# Patient Record
Sex: MALE | Race: WHITE | NOT HISPANIC OR LATINO | Employment: FULL TIME | URBAN - METROPOLITAN AREA
[De-identification: names, ages, dates, MRNs, and addresses within clinical notes are randomized per-mention and may not be internally consistent; named-entity substitution may affect disease eponyms.]

---

## 2021-06-29 NOTE — PROGRESS NOTES
SUBJECTIVE:   CC: Ney Lester is an 61 year old male who presents for preventative health visit.   Patient has been advised of split billing requirements and indicates understanding: Yes  Healthy Habits:     Getting at least 3 servings of Calcium per day:  Yes    Bi-annual eye exam:  Yes    Dental care twice a year:  NO    Sleep apnea or symptoms of sleep apnea:  None    Diet:  Regular (no restrictions)    Frequency of exercise:  2-3 days/week    Duration of exercise:  15-30 minutes    Medication side effects:  None    PHQ-2 Total Score: 2    Additional concerns today:  No  Anxiety on and off for couple years   Was on Celexa in the past stooped it  Unclear reason      Today's PHQ-2 Score:   PHQ-2 ( 1999 Pfizer) 7/2/2021   Q1: Little interest or pleasure in doing things 1   Q2: Feeling down, depressed or hopeless 1   PHQ-2 Score 2   Q1: Little interest or pleasure in doing things Several days   Q2: Feeling down, depressed or hopeless Several days   PHQ-2 Score 2     Preventive -    Immunization History   Administered Date(s) Administered     FLU 6-35 months 10/01/2009     Influenza (IIV3) PF 11/19/2007, 02/12/2009     Td (Adult), Adsorbed 07/26/1999     Tdap (Adacel,Boostrix) 02/12/2009       -STD screen:    Would like to to check for STDs before his current parter he ws with other partners with no protection       - Colon CA screen: Colonoscopy ordered     - Prostate CA screen: Discussed controversy about screening.   No results found for: PSA    - Hep C screen: ordered      -lipids screen: ordered    Diabetes screen: ordered    SH:    Marital status:    Kids: 2  Employment: farmer   Exercise: walking   Tobacco: no   Etoh: not quit   Recreational drugs: no  Caffeine: yes   Sexually active with one partner female using condoms     Abuse: Current or Past(Physical, Sexual or Emotional)- No  Do you feel safe in your environment? Yes    Have you ever done Advance Care Planning? (For example, a Health  Directive, POLST, or a discussion with a medical provider or your loved ones about your wishes): No, advance care planning information given to patient to review.  Patient declined advance care planning discussion at this time.    Social History     Tobacco Use     Smoking status: Never Smoker     Smokeless tobacco: Never Used   Substance Use Topics     Alcohol use: Not Currently         Alcohol Use 7/2/2021   Prescreen: >3 drinks/day or >7 drinks/week? No       Last PSA: No results found for: PSA    Reviewed orders with patient. Reviewed health maintenance and updated orders accordingly - Yes  Lab work is in process  BP Readings from Last 3 Encounters:   07/02/21 137/85    Wt Readings from Last 3 Encounters:   07/02/21 92.5 kg (204 lb)                  Patient Active Problem List   Diagnosis     CARDIOVASCULAR SCREENING; LDL GOAL LESS THAN 160     Past Surgical History:   Procedure Laterality Date     WRIST SURGERY Left        Social History     Tobacco Use     Smoking status: Never Smoker     Smokeless tobacco: Never Used   Substance Use Topics     Alcohol use: Not Currently     History reviewed. No pertinent family history.      No current outpatient medications on file.     No Known Allergies  No lab results found.     Reviewed and updated as needed this visit by clinical staff  Tobacco  Allergies  Meds   Med Hx  Surg Hx  Fam Hx  Soc Hx        Reviewed and updated as needed this visit by Provider                Past Medical History:   Diagnosis Date     Anxiety      Hyperlipidemia       Past Surgical History:   Procedure Laterality Date     WRIST SURGERY Left        Review of Systems   Constitutional: Negative for chills and fever.   HENT: Negative for congestion, ear pain, hearing loss and sore throat.    Eyes: Negative for pain and visual disturbance.   Respiratory: Negative for cough and shortness of breath.    Cardiovascular: Negative for chest pain, palpitations and peripheral edema.  "  Gastrointestinal: Negative for abdominal pain, constipation, diarrhea, heartburn, hematochezia and nausea.   Genitourinary: Positive for frequency and impotence. Negative for discharge, dysuria, genital sores, hematuria and urgency.   Musculoskeletal: Negative for arthralgias, joint swelling and myalgias.   Skin: Negative for rash.   Neurological: Negative for dizziness, weakness, headaches and paresthesias.   Psychiatric/Behavioral: Negative for mood changes. The patient is nervous/anxious.        OBJECTIVE:   /85   Pulse 70   Temp 97.5  F (36.4  C) (Tympanic)   Ht 1.753 m (5' 9\")   Wt 92.5 kg (204 lb)   SpO2 96%   BMI 30.13 kg/m      Physical Exam  GENERAL: healthy, alert and no distress  NECK: no adenopathy, no asymmetry, masses, or scars and thyroid normal to palpation  RESP: lungs clear to auscultation - no rales, rhonchi or wheezes  CV: regular rate and rhythm, normal S1 S2, no S3 or S4, no murmur, click or rub, no peripheral edema and peripheral pulses strong  ABDOMEN: soft, nontender, no hepatosplenomegaly, no masses and bowel sounds normal  MS: no gross musculoskeletal defects noted, no edema        ASSESSMENT/PLAN:   1. Routine general medical examination at a health care facility  Preventive care reviewed and updated     - Lipid panel reflex to direct LDL Fasting  - GLUCOSE    2. Erectile dysfunction, unspecified erectile dysfunction type    - sildenafil (REVATIO) 20 MG tablet; Take 1-5 tabs prior to intercourse on empty stomach as needed  Dispense: 30 tablet; Refill: 1    3. Anxiety and depression    - citalopram (CELEXA) 20 MG tablet; Take 1 tablet (20 mg) by mouth daily  Dispense: 90 tablet; Refill: 1    4. Screen for STD (sexually transmitted disease)    - HIV Antigen Antibody Combo  - Hepatitis C Screen Reflex to HCV RNA Quant and Genotype  - Hepatitis B core antibody [MEO8857]  - Hepatitis B Surface Antibody [[id number]]  - Hepatitis B surface antigen [[id number]]  - Treponema Abs w Reflex to " "RPR and Titer [[id number]]  - Chlamydia trachomatis PCR [[id number]]  - Neisseria gonorrhoeae PCR [[id number]]    5. Colon cancer screening  - GASTROENTEROLOGY ADULT REF PROCEDURE ONLY    6. Screening for prostate cancer    - PSA, screen    Patient has been advised of split billing requirements and indicates understanding: Yes  COUNSELING:   Reviewed preventive health counseling, as reflected in patient instructions       Regular exercise       Healthy diet/nutrition       Alcohol Use        Consider Hep C screening for all patients one time for ages 18-79 years       Syphilis screening for high risk patients        HIV screeninx in teen years, 1x in adult years, and at intervals if high risk       Colon cancer screening       Prostate cancer screening    Estimated body mass index is 30.13 kg/m  as calculated from the following:    Height as of this encounter: 1.753 m (5' 9\").    Weight as of this encounter: 92.5 kg (204 lb).     Weight management plan: Discussed healthy diet and exercise guidelines    He reports that he has never smoked. He has never used smokeless tobacco.      Counseling Resources:  ATP IV Guidelines  Pooled Cohorts Equation Calculator  FRAX Risk Assessment  ICSI Preventive Guidelines  Dietary Guidelines for Americans,   USDA's MyPlate  ASA Prophylaxis  Lung CA Screening    Ousmane Wade MD  [facility]  "

## 2021-07-02 ENCOUNTER — OFFICE VISIT (OUTPATIENT)
Dept: FAMILY MEDICINE | Facility: CLINIC | Age: 61
End: 2021-07-02
Payer: COMMERCIAL

## 2021-07-02 VITALS
BODY MASS INDEX: 30.21 KG/M2 | WEIGHT: 204 LBS | HEART RATE: 70 BPM | DIASTOLIC BLOOD PRESSURE: 85 MMHG | HEIGHT: 69 IN | TEMPERATURE: 97.5 F | SYSTOLIC BLOOD PRESSURE: 137 MMHG | OXYGEN SATURATION: 96 %

## 2021-07-02 DIAGNOSIS — F41.9 ANXIETY AND DEPRESSION: ICD-10-CM

## 2021-07-02 DIAGNOSIS — N52.9 ERECTILE DYSFUNCTION, UNSPECIFIED ERECTILE DYSFUNCTION TYPE: ICD-10-CM

## 2021-07-02 DIAGNOSIS — Z12.5 SCREENING FOR PROSTATE CANCER: ICD-10-CM

## 2021-07-02 DIAGNOSIS — F32.A ANXIETY AND DEPRESSION: ICD-10-CM

## 2021-07-02 DIAGNOSIS — Z00.00 ROUTINE GENERAL MEDICAL EXAMINATION AT A HEALTH CARE FACILITY: Primary | ICD-10-CM

## 2021-07-02 DIAGNOSIS — Z12.11 COLON CANCER SCREENING: ICD-10-CM

## 2021-07-02 DIAGNOSIS — Z11.3 SCREEN FOR STD (SEXUALLY TRANSMITTED DISEASE): ICD-10-CM

## 2021-07-02 LAB
CHOLEST SERPL-MCNC: 224 MG/DL
GLUCOSE SERPL-MCNC: 91 MG/DL (ref 70–99)
HBV CORE AB SERPL QL IA: NONREACTIVE
HBV SURFACE AB SERPL IA-ACNC: 0.57 M[IU]/ML
HBV SURFACE AG SERPL QL IA: NONREACTIVE
HCV AB SERPL QL IA: NONREACTIVE
HDLC SERPL-MCNC: 60 MG/DL
HIV 1+2 AB+HIV1 P24 AG SERPL QL IA: NONREACTIVE
LDLC SERPL CALC-MCNC: 142 MG/DL
NONHDLC SERPL-MCNC: 164 MG/DL
PSA SERPL-ACNC: 4.05 UG/L (ref 0–4)
T PALLIDUM AB SER QL: NONREACTIVE
TRIGL SERPL-MCNC: 109 MG/DL

## 2021-07-02 PROCEDURE — 87389 HIV-1 AG W/HIV-1&-2 AB AG IA: CPT | Performed by: FAMILY MEDICINE

## 2021-07-02 PROCEDURE — 86803 HEPATITIS C AB TEST: CPT | Performed by: FAMILY MEDICINE

## 2021-07-02 PROCEDURE — 86704 HEP B CORE ANTIBODY TOTAL: CPT | Performed by: FAMILY MEDICINE

## 2021-07-02 PROCEDURE — 86706 HEP B SURFACE ANTIBODY: CPT | Performed by: FAMILY MEDICINE

## 2021-07-02 PROCEDURE — 99386 PREV VISIT NEW AGE 40-64: CPT | Performed by: FAMILY MEDICINE

## 2021-07-02 PROCEDURE — 87491 CHLMYD TRACH DNA AMP PROBE: CPT | Performed by: FAMILY MEDICINE

## 2021-07-02 PROCEDURE — 87591 N.GONORRHOEAE DNA AMP PROB: CPT | Performed by: FAMILY MEDICINE

## 2021-07-02 PROCEDURE — 99213 OFFICE O/P EST LOW 20 MIN: CPT | Mod: 25 | Performed by: FAMILY MEDICINE

## 2021-07-02 PROCEDURE — 99000 SPECIMEN HANDLING OFFICE-LAB: CPT | Performed by: FAMILY MEDICINE

## 2021-07-02 PROCEDURE — 80061 LIPID PANEL: CPT | Performed by: FAMILY MEDICINE

## 2021-07-02 PROCEDURE — 36415 COLL VENOUS BLD VENIPUNCTURE: CPT | Performed by: FAMILY MEDICINE

## 2021-07-02 PROCEDURE — G0103 PSA SCREENING: HCPCS | Performed by: FAMILY MEDICINE

## 2021-07-02 PROCEDURE — 82947 ASSAY GLUCOSE BLOOD QUANT: CPT | Performed by: FAMILY MEDICINE

## 2021-07-02 PROCEDURE — 87340 HEPATITIS B SURFACE AG IA: CPT | Performed by: FAMILY MEDICINE

## 2021-07-02 PROCEDURE — 86780 TREPONEMA PALLIDUM: CPT | Mod: 90 | Performed by: FAMILY MEDICINE

## 2021-07-02 RX ORDER — SILDENAFIL CITRATE 20 MG/1
TABLET ORAL
Qty: 30 TABLET | Refills: 1 | Status: SHIPPED | OUTPATIENT
Start: 2021-07-02 | End: 2021-10-19

## 2021-07-02 RX ORDER — CITALOPRAM HYDROBROMIDE 20 MG/1
20 TABLET ORAL DAILY
Qty: 90 TABLET | Refills: 1 | Status: SHIPPED | OUTPATIENT
Start: 2021-07-02 | End: 2022-06-29

## 2021-07-02 ASSESSMENT — ENCOUNTER SYMPTOMS
NAUSEA: 0
COUGH: 0
CONSTIPATION: 0
FEVER: 0
WEAKNESS: 0
DIARRHEA: 0
ABDOMINAL PAIN: 0
HEADACHES: 0
PARESTHESIAS: 0
MYALGIAS: 0
CHILLS: 0
DYSURIA: 0
SORE THROAT: 0
NERVOUS/ANXIOUS: 1
HEARTBURN: 0
EYE PAIN: 0
SHORTNESS OF BREATH: 0
FREQUENCY: 1
HEMATOCHEZIA: 0
PALPITATIONS: 0
DIZZINESS: 0
JOINT SWELLING: 0
HEMATURIA: 0
ARTHRALGIAS: 0

## 2021-07-02 ASSESSMENT — MIFFLIN-ST. JEOR: SCORE: 1720.72

## 2021-07-03 LAB
C TRACH DNA SPEC QL NAA+PROBE: NEGATIVE
N GONORRHOEA DNA SPEC QL NAA+PROBE: NEGATIVE
SPECIMEN SOURCE: NORMAL
SPECIMEN SOURCE: NORMAL

## 2021-07-07 ENCOUNTER — TELEPHONE (OUTPATIENT)
Dept: FAMILY MEDICINE | Facility: CLINIC | Age: 61
End: 2021-07-07

## 2022-01-15 ENCOUNTER — HEALTH MAINTENANCE LETTER (OUTPATIENT)
Age: 62
End: 2022-01-15

## 2022-02-01 ENCOUNTER — OFFICE VISIT (OUTPATIENT)
Dept: FAMILY MEDICINE | Facility: CLINIC | Age: 62
End: 2022-02-01
Payer: COMMERCIAL

## 2022-02-01 VITALS
DIASTOLIC BLOOD PRESSURE: 70 MMHG | OXYGEN SATURATION: 99 % | HEART RATE: 52 BPM | SYSTOLIC BLOOD PRESSURE: 110 MMHG | TEMPERATURE: 96.9 F | WEIGHT: 216 LBS | BODY MASS INDEX: 31.99 KG/M2 | HEIGHT: 69 IN

## 2022-02-01 DIAGNOSIS — E78.5 HYPERLIPIDEMIA LDL GOAL <100: ICD-10-CM

## 2022-02-01 DIAGNOSIS — R59.0 SUPRACLAVICULAR LYMPHADENOPATHY: Primary | ICD-10-CM

## 2022-02-01 DIAGNOSIS — R97.20 ELEVATED PROSTATE SPECIFIC ANTIGEN (PSA): ICD-10-CM

## 2022-02-01 DIAGNOSIS — Z12.11 SCREEN FOR COLON CANCER: ICD-10-CM

## 2022-02-01 DIAGNOSIS — F10.20 ALCOHOLISM (H): ICD-10-CM

## 2022-02-01 LAB
ALBUMIN SERPL-MCNC: 3.8 G/DL (ref 3.4–5)
ALP SERPL-CCNC: 71 U/L (ref 40–150)
ALT SERPL W P-5'-P-CCNC: 28 U/L (ref 0–70)
ANION GAP SERPL CALCULATED.3IONS-SCNC: 6 MMOL/L (ref 3–14)
AST SERPL W P-5'-P-CCNC: 5 U/L (ref 0–45)
BASOPHILS # BLD AUTO: 0.1 10E3/UL (ref 0–0.2)
BASOPHILS NFR BLD AUTO: 2 %
BILIRUB SERPL-MCNC: 0.8 MG/DL (ref 0.2–1.3)
BUN SERPL-MCNC: 18 MG/DL (ref 7–30)
CALCIUM SERPL-MCNC: 9.3 MG/DL (ref 8.5–10.1)
CHLORIDE BLD-SCNC: 106 MMOL/L (ref 94–109)
CHOLEST SERPL-MCNC: 224 MG/DL
CO2 SERPL-SCNC: 29 MMOL/L (ref 20–32)
CREAT SERPL-MCNC: 0.99 MG/DL (ref 0.66–1.25)
EOSINOPHIL # BLD AUTO: 0.2 10E3/UL (ref 0–0.7)
EOSINOPHIL NFR BLD AUTO: 3 %
ERYTHROCYTE [DISTWIDTH] IN BLOOD BY AUTOMATED COUNT: 13.4 % (ref 10–15)
FASTING STATUS PATIENT QL REPORTED: YES
FERRITIN SERPL-MCNC: 162 NG/ML (ref 26–388)
GFR SERPL CREATININE-BSD FRML MDRD: 86 ML/MIN/1.73M2
GLUCOSE BLD-MCNC: 86 MG/DL (ref 70–99)
HCT VFR BLD AUTO: 42.7 % (ref 40–53)
HDLC SERPL-MCNC: 49 MG/DL
HGB BLD-MCNC: 14.3 G/DL (ref 13.3–17.7)
LDLC SERPL CALC-MCNC: 148 MG/DL
LYMPHOCYTES # BLD AUTO: 2.2 10E3/UL (ref 0.8–5.3)
LYMPHOCYTES NFR BLD AUTO: 39 %
MCH RBC QN AUTO: 28.1 PG (ref 26.5–33)
MCHC RBC AUTO-ENTMCNC: 33.5 G/DL (ref 31.5–36.5)
MCV RBC AUTO: 84 FL (ref 78–100)
MONOCYTES # BLD AUTO: 0.5 10E3/UL (ref 0–1.3)
MONOCYTES NFR BLD AUTO: 9 %
NEUTROPHILS # BLD AUTO: 2.6 10E3/UL (ref 1.6–8.3)
NEUTROPHILS NFR BLD AUTO: 47 %
NONHDLC SERPL-MCNC: 175 MG/DL
PLATELET # BLD AUTO: 212 10E3/UL (ref 150–450)
POTASSIUM BLD-SCNC: 4.1 MMOL/L (ref 3.4–5.3)
PROT SERPL-MCNC: 7.6 G/DL (ref 6.8–8.8)
PSA SERPL-MCNC: 4.6 UG/L (ref 0–4)
RBC # BLD AUTO: 5.09 10E6/UL (ref 4.4–5.9)
SODIUM SERPL-SCNC: 141 MMOL/L (ref 133–144)
TRIGL SERPL-MCNC: 135 MG/DL
WBC # BLD AUTO: 5.6 10E3/UL (ref 4–11)

## 2022-02-01 PROCEDURE — 36415 COLL VENOUS BLD VENIPUNCTURE: CPT | Performed by: FAMILY MEDICINE

## 2022-02-01 PROCEDURE — 80061 LIPID PANEL: CPT | Performed by: FAMILY MEDICINE

## 2022-02-01 PROCEDURE — 80053 COMPREHEN METABOLIC PANEL: CPT | Performed by: FAMILY MEDICINE

## 2022-02-01 PROCEDURE — 99214 OFFICE O/P EST MOD 30 MIN: CPT | Mod: 25 | Performed by: FAMILY MEDICINE

## 2022-02-01 PROCEDURE — G0103 PSA SCREENING: HCPCS | Performed by: FAMILY MEDICINE

## 2022-02-01 PROCEDURE — 90472 IMMUNIZATION ADMIN EACH ADD: CPT | Performed by: FAMILY MEDICINE

## 2022-02-01 PROCEDURE — 82728 ASSAY OF FERRITIN: CPT | Performed by: FAMILY MEDICINE

## 2022-02-01 PROCEDURE — 90471 IMMUNIZATION ADMIN: CPT | Performed by: FAMILY MEDICINE

## 2022-02-01 PROCEDURE — 85025 COMPLETE CBC W/AUTO DIFF WBC: CPT | Performed by: FAMILY MEDICINE

## 2022-02-01 PROCEDURE — 90682 RIV4 VACC RECOMBINANT DNA IM: CPT | Performed by: FAMILY MEDICINE

## 2022-02-01 PROCEDURE — 90715 TDAP VACCINE 7 YRS/> IM: CPT | Performed by: FAMILY MEDICINE

## 2022-02-01 ASSESSMENT — PATIENT HEALTH QUESTIONNAIRE - PHQ9
SUM OF ALL RESPONSES TO PHQ QUESTIONS 1-9: 7
SUM OF ALL RESPONSES TO PHQ QUESTIONS 1-9: 7
10. IF YOU CHECKED OFF ANY PROBLEMS, HOW DIFFICULT HAVE THESE PROBLEMS MADE IT FOR YOU TO DO YOUR WORK, TAKE CARE OF THINGS AT HOME, OR GET ALONG WITH OTHER PEOPLE: NOT DIFFICULT AT ALL

## 2022-02-01 ASSESSMENT — PAIN SCALES - GENERAL: PAINLEVEL: NO PAIN (0)

## 2022-02-01 ASSESSMENT — MIFFLIN-ST. JEOR: SCORE: 1770.15

## 2022-02-01 NOTE — NURSING NOTE
Prior to immunization administration, verified patients identity using patient s name and date of birth. Please see Immunization Activity for additional information.     Screening Questionnaire for Adult Immunization    Are you sick today?   No   Do you have allergies to medications, food, a vaccine component or latex?   No   Have you ever had a serious reaction after receiving a vaccination?   No   Do you have a long-term health problem with heart, lung, kidney, or metabolic disease (e.g., diabetes), asthma, a blood disorder, no spleen, complement component deficiency, a cochlear implant, or a spinal fluid leak?  Are you on long-term aspirin therapy?   No   Do you have cancer, leukemia, HIV/AIDS, or any other immune system problem?   No   Do you have a parent, brother, or sister with an immune system problem?   No   In the past 3 months, have you taken medications that affect  your immune system, such as prednisone, other steroids, or anticancer drugs; drugs for the treatment of rheumatoid arthritis, Crohn s disease, or psoriasis; or have you had radiation treatments?   No   Have you had a seizure, or a brain or other nervous system problem?   No   During the past year, have you received a transfusion of blood or blood    products, or been given immune (gamma) globulin or antiviral drug?   No   For women: Are you pregnant or is there a chance you could become       pregnant during the next month?   No   Have you received any vaccinations in the past 4 weeks?   No     Immunization questionnaire answers were all negative.        Per orders of Dr. Wade, injection of FLU & Tdap given by Elizabeth Roca CMA. Patient instructed to remain in clinic for 15 minutes afterwards, and to report any adverse reaction to me immediately.       Screening performed by Elizabeth Roca CMA on 2/1/2022 at 10:41 AM.

## 2022-02-01 NOTE — PATIENT INSTRUCTIONS
1. Recheck PSA today if elevated will send you to Urology     2. Check lipid profile today , consider starting statin therapy     3. Obtain CT neck for concern of enlarged lymph node above the left clavicle     4. Great work stopping alcohol , will check liver profile     5.scheule colonoscopy for colon cancer screen     6. Vaccine today TDAP and Flu

## 2022-02-01 NOTE — PROGRESS NOTES
"  Assessment & Plan     Supraclavicular lymphadenopathy  Noticed on exam today . Painless enlarged left supraclavicular lymphadenopathy concerning for cancer   Patient is asymptomatic   - CT Soft Tissue Neck w/o & w Contrast; Future  - CBC with platelets and differential; Future  - Ferritin; Future    Hyperlipidemia LDL goal <100  Chronic stable problem   cosider starting statin therapy   The 10-year ASCVD risk score (Sean LUND Jr., et al., 2013) is: 7.6%    Values used to calculate the score:      Age: 62 years      Sex: Male      Is Non- : No      Diabetic: No      Tobacco smoker: No      Systolic Blood Pressure: 110 mmHg      Is BP treated: No      HDL Cholesterol: 60 mg/dL      Total Cholesterol: 224 mg/dL    - Lipid panel reflex to direct LDL Fasting; Future    Elevated prostate specific antigen (PSA)  PSA   Date Value Ref Range Status   07/02/2021 4.05 (H) 0 - 4 ug/L Final     Comment:     Assay Method:  Chemiluminescence using Siemens Vista analyzer     If elevated will refer to Urology   - PSA, screen; Future    Alcoholism (H)  He stopped drinking in 07/2021  Continue to monitor   Check liver panel   - Comprehensive metabolic panel (BMP + Alb, Alk Phos, ALT, AST, Total. Bili, TP); Future    Screen for colon cancer    - Adult Gastro Ref - Procedure Only; Future    Patient Instructions   1. Recheck PSA today if elevated will send you to Urology     2. Check lipid profile today , consider starting statin therapy     3. Obtain CT neck for concern of enlarged lymph node above the left clavicle     4. Great work stopping alcohol , will check liver profile     5.scheule colonoscopy for colon cancer screen     6. Vaccine today TDAP and Flu                  BMI:   Estimated body mass index is 31.9 kg/m  as calculated from the following:    Height as of this encounter: 1.753 m (5' 9\").    Weight as of this encounter: 98 kg (216 lb).           Return in about 3 months (around 5/1/2022) for Follow " up.    Ousmane Wade MD  [facility] CHENCHO Brewster is a 62 year old who presents for the following health issues     History of Present Illness       Hyperlipidemia:  He presents for follow up of hyperlipidemia.  He is not taking medication to lower cholesterol. He is not having myalgia or other side effects to statin medications.    He eats 0-1 servings of fruits and vegetables daily.He consumes 0 sweetened beverage(s) daily.He exercises with enough effort to increase his heart rate 30 to 60 minutes per day.  He exercises with enough effort to increase his heart rate 4 days per week. He is missing 2 dose(s) of medications per week.     Answers for HPI/ROS submitted by the patient on 2/1/2022  If you checked off any problems, how difficult have these problems made it for you to do your work, take care of things at home, or get along with other people?: Not difficult at all  PHQ9 TOTAL SCORE: 7          Review of Systems   Constitutional, HEENT, cardiovascular, pulmonary, gi and gu systems are negative, except as otherwise noted.      Objective    There were no vitals taken for this visit.  There is no height or weight on file to calculate BMI.  Physical Exam  Vitals and nursing note reviewed.   Constitutional:       General: He is not in acute distress.     Appearance: Normal appearance. He is not ill-appearing, toxic-appearing or diaphoretic.   HENT:      Head: Atraumatic.   Neck:     Chest:   Breasts:      Right: No supraclavicular adenopathy.      Left: Supraclavicular adenopathy present.       Lymphadenopathy:      Upper Body:      Right upper body: No supraclavicular adenopathy.      Left upper body: Supraclavicular adenopathy present.   Neurological:      Mental Status: He is alert.

## 2022-02-02 ENCOUNTER — TELEPHONE (OUTPATIENT)
Dept: FAMILY MEDICINE | Facility: CLINIC | Age: 62
End: 2022-02-02

## 2022-02-02 ENCOUNTER — ANCILLARY PROCEDURE (OUTPATIENT)
Dept: CT IMAGING | Facility: CLINIC | Age: 62
End: 2022-02-02
Attending: FAMILY MEDICINE
Payer: COMMERCIAL

## 2022-02-02 DIAGNOSIS — R59.0 SUPRACLAVICULAR LYMPHADENOPATHY: ICD-10-CM

## 2022-02-02 PROCEDURE — 70491 CT SOFT TISSUE NECK W/DYE: CPT | Mod: TC | Performed by: RADIOLOGY

## 2022-02-02 RX ORDER — IOPAMIDOL 755 MG/ML
80 INJECTION, SOLUTION INTRAVASCULAR ONCE
Status: COMPLETED | OUTPATIENT
Start: 2022-02-02 | End: 2022-02-02

## 2022-02-02 RX ADMIN — IOPAMIDOL 80 ML: 755 INJECTION, SOLUTION INTRAVASCULAR at 13:23

## 2022-02-02 ASSESSMENT — PATIENT HEALTH QUESTIONNAIRE - PHQ9: SUM OF ALL RESPONSES TO PHQ QUESTIONS 1-9: 7

## 2022-02-02 NOTE — TELEPHONE ENCOUNTER
Left a message to return a call to [phone].  Nenita Flood R.N.     RN:  Whomever speaks with Ney please relay the message from Dr. Wade.  If further support is needed please take down what is needed and route the message back to the provider  Thank you. Nenita Flood R.N.

## 2022-02-02 NOTE — TELEPHONE ENCOUNTER
----- Message from Ousmane Wade MD sent at 2/2/2022  9:15 AM CST -----  Please call the patient     Tiffanie Brewster  PSA still elevated . Next step is to a urologist for further evaluation and management with possible biopsy.    Referral placed    Urology  [address] [place] [address]  [place] [address]  Phone: [phone]    Comment: Please be aware that coverage of these services is subject to the terms and limitations of your health insurance plan.  Call member services at your health plan with any benefit or coverage questions.  [employer] will call you to coordinate your care as prescribed by the provider.  If you don't hear from a representative within 2 business days, please call the number listed above.      Please continue with the plan as we discussed in the clinic.  Feel free to contact the clinic with any questions or concerns. Thank you for allowing us to participate in your care.    Ousmane Wade MD.

## 2022-02-02 NOTE — TELEPHONE ENCOUNTER
Patient informed of provider note as below. Patient verbalized understanding    Bridget Small BSN, RN

## 2022-02-18 ENCOUNTER — TELEPHONE (OUTPATIENT)
Dept: FAMILY MEDICINE | Facility: CLINIC | Age: 62
End: 2022-02-18
Payer: COMMERCIAL

## 2022-02-18 NOTE — TELEPHONE ENCOUNTER
Left a message to return a call to [phone].  Nenita Flood R.N.      RN:  Whomever speaks with Ney please follow up and see if he received the message from Dr. Wade below. In looking at the lab result note, I do see that it has been seen by the patient.  Thank you. Neinta Flood R.N.    Tiffanie Brewster,  CT neck showed enlarged lymph node above the clavicle.  Per radiologist interpretation this is an indeterminate at this time.  This lymph node could be reactive due to inflammation or infection or it could also be due to cancer.  The next step is to see ENT to discuss biopsy from the lymph node to rule out malignancy.  Referral placed      Ent  [id number] [address] [place] [id number]  Phone: [phone]    Comment: Please be aware that coverage of these services is subject to the terms and limitations of your health insurance plan.  Call member services at your health plan with any benefit or coverage questions.  Please call to schedule your appointment           Please continue with the plan as we discussed in the clinic.  Feel free to contact the clinic with any questions or concerns. Thank you for allowing us to participate in your care.     Ousmane Wade MD.

## 2022-02-18 NOTE — TELEPHONE ENCOUNTER
Patient informed of provider note as below. Patient verbalized understanding  Patient requested referral information to be sent via [facility]  Bridget GARRETTN, RN

## 2022-02-18 NOTE — TELEPHONE ENCOUNTER
----- Message from Ousmane Wade MD sent at 2/17/2022  8:07 PM CST -----  Please call the patient to make sure he is aware that he need to follow up with ENT for lymph node biopsy  he may need assistance to schedule .  Referral was placed   Ousmane Wade MD.

## 2022-03-31 ENCOUNTER — OFFICE VISIT (OUTPATIENT)
Dept: UROLOGY | Facility: CLINIC | Age: 62
End: 2022-03-31
Payer: COMMERCIAL

## 2022-03-31 VITALS — DIASTOLIC BLOOD PRESSURE: 78 MMHG | RESPIRATION RATE: 16 BRPM | HEART RATE: 64 BPM | SYSTOLIC BLOOD PRESSURE: 137 MMHG

## 2022-03-31 DIAGNOSIS — R97.20 ELEVATED PROSTATE SPECIFIC ANTIGEN (PSA): ICD-10-CM

## 2022-03-31 PROCEDURE — 51798 US URINE CAPACITY MEASURE: CPT | Performed by: UROLOGY

## 2022-03-31 PROCEDURE — 99204 OFFICE O/P NEW MOD 45 MIN: CPT | Mod: 25 | Performed by: UROLOGY

## 2022-03-31 NOTE — NURSING NOTE
"Initial /78 (BP Location: Left arm, Patient Position: Chair, Cuff Size: Adult Regular)   Pulse 64   Resp 16  Estimated body mass index is 31.9 kg/m  as calculated from the following:    Height as of 2/1/22: 1.753 m (5' 9\").    Weight as of 2/1/22: 98 kg (216 lb). .    Patient is here for elevated psa.  peter glez LPN    "

## 2022-03-31 NOTE — PROGRESS NOTES
CC: elevated PSA    HPI: 61 yo male with elevated PSA with history of frequent voiding since 20's.  No change in his symptoms recently.    PMHx: Hx ETOH abuse, ED  PSHx: none  MEDS: citalopram, sildenafil  NKDA  FHx: father with CaP, brother with BPH  SocHx: neg tobac, ETOH neg for a month  ROS: neg for f/c/s, n/v, wt changes    OBJECTIVE:    abd soft, NT  circ phallus, NL meatus  Left Testis atrophic, no masses  Right testis no masses  WILLIAM: limited to apex, no masses    PSA 7/2/21: 4.05 ng/mL  PSA 2/1/22 4.6 ng/mL    VOID/PVR: 150/0 mL    ASSESSMENT AND PLAN:   Over half of today's 47-minute visit was spent reviewing the chart, results and counseling the patient regarding his elevated PSA.  Given the persistently elevated PSA on 2 recent measures, we will move forward with a prostate MRI.  The patient will call for results.  We will make further decisions on the need  For biopsy based upon those results. The patient is in agreement with the plan.

## 2022-04-01 ENCOUNTER — OFFICE VISIT (OUTPATIENT)
Dept: OTOLARYNGOLOGY | Facility: CLINIC | Age: 62
End: 2022-04-01
Attending: FAMILY MEDICINE
Payer: COMMERCIAL

## 2022-04-01 VITALS — SYSTOLIC BLOOD PRESSURE: 138 MMHG | HEART RATE: 89 BPM | DIASTOLIC BLOOD PRESSURE: 79 MMHG

## 2022-04-01 DIAGNOSIS — R59.0 SUPRACLAVICULAR LYMPHADENOPATHY: ICD-10-CM

## 2022-04-01 PROCEDURE — 99203 OFFICE O/P NEW LOW 30 MIN: CPT | Mod: 25 | Performed by: OTOLARYNGOLOGY

## 2022-04-01 PROCEDURE — 31575 DIAGNOSTIC LARYNGOSCOPY: CPT | Performed by: OTOLARYNGOLOGY

## 2022-04-01 ASSESSMENT — ENCOUNTER SYMPTOMS
CONSTITUTIONAL NEGATIVE: 1
BLURRED VISION: 0
HEADACHES: 0
BRUISES/BLEEDS EASILY: 0
NAUSEA: 0
TREMORS: 0
TINGLING: 0
PHOTOPHOBIA: 0
SPUTUM PRODUCTION: 0
COUGH: 0
HEMOPTYSIS: 0
DOUBLE VISION: 0
DIZZINESS: 0
VOMITING: 0
HEARTBURN: 0

## 2022-04-01 NOTE — LETTER
4/1/2022         RE: Ney Lester  [address] [facility] [address]        Dear Colleague,    Thank you for referring your patient, Ney Lester, to the [facility]. Please see a copy of my visit note below.    Chief Complaint   Patient presents with     Consult     supraclavicular Lymphadenopathy         HPI    This pleasant patient is referred to us for enlarged lymph nodes including left supraclavicular LAP. His primary physician noticed that a couple of months ago. Denies any weight changes, fever, night sweat, difficulty swallowing, voice changes, allergies, post nasal drip, coughing or reflux. He is otherwise healthy. He is not a smoker.    PSA is elevated,  CBC Is WNLs.    CT SCAN OF THE NECK WITH CONTRAST  2/2/2022 1:33 PM      HISTORY: Supraclavicular lymphadenopathy.     TECHNIQUE: Axial CT images of the neck following administration of  intravenous contrast with reformations. Radiation dose for this scan  was reduced using automated exposure control, adjustment of the mA  and/or kV according to patient size, or iterative reconstruction  technique. 80 mL Isovue-370 IV.      COMPARISON: None.      FINDINGS:   Visualized paranasal sinuses, skull base and orbits: Minimal mucosal  thickening noted in the right maxillary sinus. Otherwise the  visualized paranasal sinuses are clear. Visualized aspects of the  mastoid and middle ear cavities are clear.      Oral cavity, pharynx, larynx and visualized trachea: Calcified  bilateral palatine tonsilloliths are present. There also may be a  small calcified tonsillolith in the left fossa of Rosenmuller  associated with the left pharyngeal tonsil. Evaluation of the oral  cavity and pharynx is somewhat limited due to streak artifact from the  patient's dental amalgam. Within that limitation, the oral  cavity/floor of mouth structures appear normal. There is mild  symmetric fullness of the palatine tonsils, as well as mild  prominence  of the soft tissue of the base of tongue, likely prominence of the  lingual tonsil tissue. This is probably physiologic/reactive. No  discrete focal enhancing mass is identified along the mucosal spaces  of the upper aerodigestive tract on CT. The true vocal folds and  subglottic larynx/upper trachea appear normal.      and parapharyngeal spaces: Unremarkable.      Thyroid: No dominant mass or nodule seen. Normal appearance of the  thyroid gland.      Submandibular glands: Unremarkable.      Parotid glands: Unremarkable.       Lymph nodes: There are a few mildly prominent/borderline enlarged  bilateral level Ib and level IIa lymph nodes. For example, there is a  right level IIa lymph node that measures 1.6 cm (series 4 image 33)  and a right level Ib lymph node that measures 1.2 cm (series 4 image  30). These lymph nodes are thought most likely to be  reactive/physiologic.      There is asymmetric soft tissue nodularity in the region of the left  supraclavicular fossa, of uncertain etiology (for example, see series  4 image 63). This may represent multiple clustered small low left  level IV/V/supraclavicular lymph nodes. There are a few mildly  prominent/borderline enlarged left level V/supraclavicular lymph nodes  and prominent/borderline enlarged low left level IV lymph nodes. For  example, there is a left supraclavicular lymph node that measures 1.1  cm in the axial plane (series 4 image 57). There is a left level IV  lymph node positioned posterior to the left jugular vein measuring  10-11 mm (series 4 image 61). The findings are  nonspecific/indeterminate.     Vasculature: Bilateral carotid bifurcation atherosclerotic disease  without definite flow-limiting stenosis.      Visualized upper mediastinum and lungs: Unremarkable.      Bones: Multilevel degenerative changes noted in the cervical spine. No  aggressive-appearing osseous lesion is identified.                                                                        IMPRESSION:  1. Asymmetric soft tissue nodularity in the left supraclavicular fossa  with multiple prominent/borderline enlarged left level IV/V and  supraclavicular lymph nodes. These findings are considered  indeterminate at this time. They may be reactive, but  malignancy/metastatic disease is not excluded. Consider short interval  follow-up versus tissue sampling.  2. Mildly prominent symmetric enlargement of the palatine and lingual  tonsils, probably reactive. No discrete enhancing mass identified  along the mucosal spaces of the upper aerodigestive tract.    Review of Systems   Constitutional: Negative.    HENT: Negative.    Eyes: Negative for blurred vision, double vision and photophobia.   Respiratory: Negative for cough, hemoptysis and sputum production.    Gastrointestinal: Negative for heartburn, nausea and vomiting.   Skin: Negative.    Neurological: Negative for dizziness, tingling, tremors and headaches.   Endo/Heme/Allergies: Negative for environmental allergies. Does not bruise/bleed easily.         Physical Exam  Constitutional:       Appearance: Normal appearance.   HENT:      Head: Normocephalic and atraumatic.      Right Ear: Hearing, tympanic membrane, ear canal and external ear normal. There is no impacted cerumen.      Left Ear: Hearing, tympanic membrane, ear canal and external ear normal. There is no impacted cerumen.      Nose: Mucosal edema present. No septal deviation.      Right Turbinates: Not swollen.      Left Turbinates: Not swollen.      Mouth/Throat:      Mouth: Mucous membranes are moist.      Pharynx: Oropharynx is clear. Uvula midline.      Tonsils: No tonsillar exudate or tonsillar abscesses.   Neurological:      Mental Status: He is alert.       Diagnostic nasal endoscopy:     He was seen in the room and identified. Pros and cons of the procedure were explained to the patient. The procedure and its alternatives were explained to the patient in  lay terms. His questions were answered. His symptoms required a diagnostic endoscopic evaluation under local anesthesia. After obtaining informed consent from the patient, 3% Lidocaine with oxymetazoline spray was applied to each nostril. Then a flexible scopic exam was performed. Septum is deviated to the right and then to the left. Ostiomeatal complexes are free of disease. No pus or polyp seen. Inferior turbinates are enlarged. Nasopharynx is normal. Rosenmüller fossa and torus tubarius are normal. Epiglottis, hypopharynx, false vocal folds are normal. No pooling in pyriform fossae. Vocal cords are mobile, and normal. He tolerated the procedure well and left the room with no complications.    A/P  This pleasant patient has bilateral slightly enlarged, architecturally normal lymph nodes and left supraclavicular fullness. I will refer him to gastroenterology for further evaluation and treatment and see him in the f/u in a month.      Again, thank you for allowing me to participate in the care of your patient.        Sincerely,        Ashutosh Reeves MD

## 2022-04-01 NOTE — PROGRESS NOTES
HPI    This pleasant patient is referred to us for enlarged lymph nodes including left supraclavicular LAP. His primary physician noticed that a couple of months ago. Denies any weight changes, fever, night sweat, difficulty swallowing, voice changes, allergies, post nasal drip, coughing or reflux. He is otherwise healthy. He is not a smoker.    PSA is elevated,  CBC Is WNLs.    CT SCAN OF THE NECK WITH CONTRAST  2/2/2022 1:33 PM      HISTORY: Supraclavicular lymphadenopathy.     TECHNIQUE: Axial CT images of the neck following administration of  intravenous contrast with reformations. Radiation dose for this scan  was reduced using automated exposure control, adjustment of the mA  and/or kV according to patient size, or iterative reconstruction  technique. 80 mL Isovue-370 IV.      COMPARISON: None.      FINDINGS:   Visualized paranasal sinuses, skull base and orbits: Minimal mucosal  thickening noted in the right maxillary sinus. Otherwise the  visualized paranasal sinuses are clear. Visualized aspects of the  mastoid and middle ear cavities are clear.      Oral cavity, pharynx, larynx and visualized trachea: Calcified  bilateral palatine tonsilloliths are present. There also may be a  small calcified tonsillolith in the left fossa of Rosenmuller  associated with the left pharyngeal tonsil. Evaluation of the oral  cavity and pharynx is somewhat limited due to streak artifact from the  patient's dental amalgam. Within that limitation, the oral  cavity/floor of mouth structures appear normal. There is mild  symmetric fullness of the palatine tonsils, as well as mild prominence  of the soft tissue of the base of tongue, likely prominence of the  lingual tonsil tissue. This is probably physiologic/reactive. No  discrete focal enhancing mass is identified along the mucosal spaces  of the upper aerodigestive tract on CT. The true vocal folds and  subglottic larynx/upper trachea appear normal.      and  parapharyngeal spaces: Unremarkable.      Thyroid: No dominant mass or nodule seen. Normal appearance of the  thyroid gland.      Submandibular glands: Unremarkable.      Parotid glands: Unremarkable.       Lymph nodes: There are a few mildly prominent/borderline enlarged  bilateral level Ib and level IIa lymph nodes. For example, there is a  right level IIa lymph node that measures 1.6 cm (series 4 image 33)  and a right level Ib lymph node that measures 1.2 cm (series 4 image  30). These lymph nodes are thought most likely to be  reactive/physiologic.      There is asymmetric soft tissue nodularity in the region of the left  supraclavicular fossa, of uncertain etiology (for example, see series  4 image 63). This may represent multiple clustered small low left  level IV/V/supraclavicular lymph nodes. There are a few mildly  prominent/borderline enlarged left level V/supraclavicular lymph nodes  and prominent/borderline enlarged low left level IV lymph nodes. For  example, there is a left supraclavicular lymph node that measures 1.1  cm in the axial plane (series 4 image 57). There is a left level IV  lymph node positioned posterior to the left jugular vein measuring  10-11 mm (series 4 image 61). The findings are  nonspecific/indeterminate.     Vasculature: Bilateral carotid bifurcation atherosclerotic disease  without definite flow-limiting stenosis.      Visualized upper mediastinum and lungs: Unremarkable.      Bones: Multilevel degenerative changes noted in the cervical spine. No  aggressive-appearing osseous lesion is identified.                                                                       IMPRESSION:  1. Asymmetric soft tissue nodularity in the left supraclavicular fossa  with multiple prominent/borderline enlarged left level IV/V and  supraclavicular lymph nodes. These findings are considered  indeterminate at this time. They may be reactive, but  malignancy/metastatic disease is not excluded.  Consider short interval  follow-up versus tissue sampling.  2. Mildly prominent symmetric enlargement of the palatine and lingual  tonsils, probably reactive. No discrete enhancing mass identified  along the mucosal spaces of the upper aerodigestive tract.    Review of Systems   Constitutional: Negative.    HENT: Negative.    Eyes: Negative for blurred vision, double vision and photophobia.   Respiratory: Negative for cough, hemoptysis and sputum production.    Gastrointestinal: Negative for heartburn, nausea and vomiting.   Skin: Negative.    Neurological: Negative for dizziness, tingling, tremors and headaches.   Endo/Heme/Allergies: Negative for environmental allergies. Does not bruise/bleed easily.         Physical Exam  Constitutional:       Appearance: Normal appearance.   HENT:      Head: Normocephalic and atraumatic.      Right Ear: Hearing, tympanic membrane, ear canal and external ear normal. There is no impacted cerumen.      Left Ear: Hearing, tympanic membrane, ear canal and external ear normal. There is no impacted cerumen.      Nose: Mucosal edema present. No septal deviation.      Right Turbinates: Not swollen.      Left Turbinates: Not swollen.      Mouth/Throat:      Mouth: Mucous membranes are moist.      Pharynx: Oropharynx is clear. Uvula midline.      Tonsils: No tonsillar exudate or tonsillar abscesses.   Neurological:      Mental Status: He is alert.       Diagnostic nasal endoscopy:     He was seen in the room and identified. Pros and cons of the procedure were explained to the patient. The procedure and its alternatives were explained to the patient in lay terms. His questions were answered. His symptoms required a diagnostic endoscopic evaluation under local anesthesia. After obtaining informed consent from the patient, 3% Lidocaine with oxymetazoline spray was applied to each nostril. Then a flexible scopic exam was performed. Septum is deviated to the right and then to the left.  Ostiomeatal complexes are free of disease. No pus or polyp seen. Inferior turbinates are enlarged. Nasopharynx is normal. Rosenmüller fossa and torus tubarius are normal. Epiglottis, hypopharynx, false vocal folds are normal. No pooling in pyriform fossae. Vocal cords are mobile, and normal. He tolerated the procedure well and left the room with no complications.    A/P  This pleasant patient has bilateral slightly enlarged, architecturally normal lymph nodes and left supraclavicular fullness. I will refer him to gastroenterology for further evaluation and treatment and see him in the f/u in a month.

## 2022-04-04 ENCOUNTER — TELEPHONE (OUTPATIENT)
Dept: GASTROENTEROLOGY | Facility: CLINIC | Age: 62
End: 2022-04-04
Payer: COMMERCIAL

## 2022-04-04 NOTE — TELEPHONE ENCOUNTER
[facility] Call Center    Phone Message    May a detailed message be left on voicemail: yes     Reason for Call: Other: Patient is being referred for Supraclavicular lymphadenopathy and dx is not in GI guidelines. Please review for further evaluation. Thanks!      Action Taken: Message routed to:  [facility] ([facility]): GI    Travel Screening: Not Applicable

## 2022-04-13 NOTE — TELEPHONE ENCOUNTER
I don't think he needs a GI eval at all... He needs either a CT chest/ab/pelv, or a biopsy of his lymph node.     Can we let him know that his case was discussed between GI and his primary care and he should reach out to his PCP for further care?     Contacted patient and left a voice mail with the message noted above and to reach out to his primary care provider.

## 2022-04-14 DIAGNOSIS — R59.0 SUPRACLAVICULAR LYMPHADENOPATHY: Primary | ICD-10-CM

## 2022-04-14 NOTE — PROGRESS NOTES
Left a message to return a call to [phone].  Nenita Flood R.N.    RN:  Whomever speaks with Ney please relay the message from Dr. Wade.  Please asked who referred him to GI and send back to Dr. Wade. Thank you. Nenita Flood R.N.    [facility] scheduling [phone] to schedule CT

## 2022-04-14 NOTE — PROGRESS NOTES
Please reach out to the patient to let him know that I discussed his condition with  (gastroenterologist), we need to schedule him for CT chest abdomen pelvis first before he see  GI  CT ordered     He was referred to ENT and saw Dr. Reeves who referred him to GI.

## 2022-04-15 NOTE — PROGRESS NOTES
I tried to call the patient and received the following message - The mailbox if full and can't receive messages at this time.  Thank you. Nenita Flood R.N.

## 2022-04-18 NOTE — PROGRESS NOTES
Pt notified of provider message as written.  Pt verbalized good understanding. Scheduling number sent to pt through [employer].  Jewell GARRETTN, RN

## 2022-04-21 ENCOUNTER — ANCILLARY PROCEDURE (OUTPATIENT)
Dept: CT IMAGING | Facility: CLINIC | Age: 62
End: 2022-04-21
Attending: FAMILY MEDICINE
Payer: COMMERCIAL

## 2022-04-21 DIAGNOSIS — R59.0 SUPRACLAVICULAR LYMPHADENOPATHY: ICD-10-CM

## 2022-04-21 PROCEDURE — 71260 CT THORAX DX C+: CPT | Mod: TC | Performed by: RADIOLOGY

## 2022-04-21 PROCEDURE — 74177 CT ABD & PELVIS W/CONTRAST: CPT | Mod: TC | Performed by: RADIOLOGY

## 2022-04-21 RX ORDER — IOPAMIDOL 755 MG/ML
106 INJECTION, SOLUTION INTRAVASCULAR ONCE
Status: COMPLETED | OUTPATIENT
Start: 2022-04-21 | End: 2022-04-21

## 2022-04-21 RX ADMIN — IOPAMIDOL 106 ML: 755 INJECTION, SOLUTION INTRAVASCULAR at 09:29

## 2022-04-25 ENCOUNTER — TELEPHONE (OUTPATIENT)
Dept: GASTROENTEROLOGY | Facility: CLINIC | Age: 62
End: 2022-04-25
Payer: COMMERCIAL

## 2022-04-25 DIAGNOSIS — Z11.59 ENCOUNTER FOR SCREENING FOR OTHER VIRAL DISEASES: Primary | ICD-10-CM

## 2022-04-25 NOTE — TELEPHONE ENCOUNTER
Screening Questions  BlueKIND OF PREP RedLOCATION [review exclusion criteria] GreenSEDATION TYPE  1. Have you had a positive covid test in the last 90 days? N     2. Do you have a legal guardian or medical Power of ?N  Are you able to give consent for your medical care?Y (Sedation review/consideration needed)    3. Are you active on mychart?     4. What insurance is in the chart? [employer]     3.   Ordering/Referring Provider: JOAQUIN    4. BMI 31 [BMI OVER 40-EXTENDED PREP]  If greater than 40 review exclusion criteria [PAC APPT IF @ [facility]]        5.  Respiratory Screening :  [If yes to any of the following HOSPITAL setting only]     Do you use daily home oxygen? N    Do you have mod to severe Obstructive Sleep Apnea? N  [OKAY @ [facility] SH PH RI]   Do you have Pulmonary Hypertension? N     Do you have UNCONTROLLED asthma? N        6.   Have you had a heart or lung transplant? N      7.   Are you currently on dialysis? N [ If yes, G-PREP & HOSPITAL setting only]     8.   Do you have chronic kidney disease? N [ If yes, G-PREP ]    9.   Have you had a stroke or Transient ischemic attack (TIA - aka  mini stroke ) within 6 months?  N (If yes, please review exclusion criteria)    10.   In the past 6 months, have you had any heart related issues including cardiomyopathy or heart attack? N           If yes, did it require cardiac stenting or other implantable device?       11.   Do you have any implantable devices in your body (pacemaker, defib, LVAD)? N (If yes, please review exclusion criteria)    12.   Do you take nitroglycerin? N           If yes, how often?   (if yes, HOSPITAL setting ONLY)    13.   Are you currently taking any blood thinners? N           [IF YES, INFORM PATIENT TO FOLLOW UP W/ ORDERING PROVIDER FOR BRIDGING INSTRUCTIONS]     14.   Do you have a diagnosis of diabetes? N   [ If yes, G-PREP ]    15.   [FEMALES] Are you currently pregnant?     If yes, how many weeks?     16.   Are you taking  any prescription pain medications on a routine schedule?  N  [ If yes, EXTENDED PREP.] [If yes, MAC]    17.   Do you have any chemical dependencies such as alcohol, street drugs, or methadone?  RECOVERED ALCOHOLIC SINCE June 2021 [If yes, MAC]    18.   Do you have any history of post-traumatic stress syndrome, severe anxiety or history of psychosis?  N  [If yes, MAC]    19.   Do you transfer independently?  Y    20.  On a regular basis do you go 3-5 days between bowel movements? N   [ If yes, EXTENDED PREP.]    21.   Preferred LOCAL Pharmacy for Pre Prescription   [id number] - [facility] [address] [address]      Scheduling Details      Caller : Christiana Lord ([facility])  (Please ask for phone number if not scheduled by patient)    Type of Procedure Scheduled: COLON  Which Colonoscopy Prep was Sent?: LINN MULLER CF PATIENTS & GROEN'S PATIENTS NEEDS EXTENDED PREP  Surgeon: KIRSTIE  Date of Procedure: 5/9/22  Location:       Sedation Type: CS  Conscious Sedation- Needs  for 6 hours after the procedure  MAC/General-Needs  for 24 hours after procedure    Pre-op Required at [facility] [place], [place] and OR for MAC sedation: N  (advise patient they will need a pre-op prior to procedure -)      Informed patient they will need an adult  Y  Cannot take any type of public or medical transportation alone    Pre-Procedure Covid test to be completed at [facility] or Externally: 5/6/22 @[facility]    Confirmed Nurse will call to complete assessment Y    Additional comments:

## 2022-05-06 ENCOUNTER — LAB (OUTPATIENT)
Dept: LAB | Facility: CLINIC | Age: 62
End: 2022-05-06
Payer: COMMERCIAL

## 2022-05-06 DIAGNOSIS — Z11.59 ENCOUNTER FOR SCREENING FOR OTHER VIRAL DISEASES: ICD-10-CM

## 2022-05-06 PROCEDURE — U0003 INFECTIOUS AGENT DETECTION BY NUCLEIC ACID (DNA OR RNA); SEVERE ACUTE RESPIRATORY SYNDROME CORONAVIRUS 2 (SARS-COV-2) (CORONAVIRUS DISEASE [COVID-19]), AMPLIFIED PROBE TECHNIQUE, MAKING USE OF HIGH THROUGHPUT TECHNOLOGIES AS DESCRIBED BY CMS-2020-01-R: HCPCS

## 2022-05-06 PROCEDURE — U0005 INFEC AGEN DETEC AMPLI PROBE: HCPCS

## 2022-05-07 LAB — SARS-COV-2 RNA RESP QL NAA+PROBE: NEGATIVE

## 2022-05-08 ENCOUNTER — ANCILLARY PROCEDURE (OUTPATIENT)
Dept: MRI IMAGING | Facility: CLINIC | Age: 62
End: 2022-05-08
Attending: UROLOGY
Payer: COMMERCIAL

## 2022-05-08 DIAGNOSIS — R97.20 ELEVATED PROSTATE SPECIFIC ANTIGEN (PSA): ICD-10-CM

## 2022-05-08 PROCEDURE — A9585 GADOBUTROL INJECTION: HCPCS | Performed by: RADIOLOGY

## 2022-05-08 PROCEDURE — 72197 MRI PELVIS W/O & W/DYE: CPT | Performed by: RADIOLOGY

## 2022-05-08 RX ORDER — GADOBUTROL 604.72 MG/ML
10 INJECTION INTRAVENOUS ONCE
Status: COMPLETED | OUTPATIENT
Start: 2022-05-08 | End: 2022-05-08

## 2022-05-08 RX ADMIN — GADOBUTROL 10 ML: 604.72 INJECTION INTRAVENOUS at 08:20

## 2022-05-09 ENCOUNTER — HOSPITAL ENCOUNTER (OUTPATIENT)
Facility: AMBULATORY SURGERY CENTER | Age: 62
Discharge: HOME | End: 2022-05-09
Attending: SURGERY | Admitting: SURGERY
Payer: COMMERCIAL

## 2022-05-09 VITALS
OXYGEN SATURATION: 96 % | HEART RATE: 65 BPM | SYSTOLIC BLOOD PRESSURE: 131 MMHG | RESPIRATION RATE: 16 BRPM | DIASTOLIC BLOOD PRESSURE: 67 MMHG | TEMPERATURE: 97.8 F

## 2022-05-09 LAB — COLONOSCOPY: NORMAL

## 2022-05-09 PROCEDURE — 45385 COLONOSCOPY W/LESION REMOVAL: CPT | Mod: PT

## 2022-05-09 PROCEDURE — 88305 TISSUE EXAM BY PATHOLOGIST: CPT | Performed by: PATHOLOGY

## 2022-05-09 PROCEDURE — G8918 PT W/O PREOP ORDER IV AB PRO: HCPCS

## 2022-05-09 PROCEDURE — 45385 COLONOSCOPY W/LESION REMOVAL: CPT | Mod: PT | Performed by: SURGERY

## 2022-05-09 PROCEDURE — 99152 MOD SED SAME PHYS/QHP 5/>YRS: CPT | Mod: 59 | Performed by: SURGERY

## 2022-05-09 PROCEDURE — G8907 PT DOC NO EVENTS ON DISCHARG: HCPCS

## 2022-05-09 RX ORDER — ONDANSETRON 4 MG/1
4 TABLET, ORALLY DISINTEGRATING ORAL EVERY 6 HOURS PRN
Status: DISCONTINUED | OUTPATIENT
Start: 2022-05-09 | End: 2022-05-10 | Stop reason: HOSPADM

## 2022-05-09 RX ORDER — NALOXONE HYDROCHLORIDE 0.4 MG/ML
0.4 INJECTION, SOLUTION INTRAMUSCULAR; INTRAVENOUS; SUBCUTANEOUS
Status: DISCONTINUED | OUTPATIENT
Start: 2022-05-09 | End: 2022-05-10 | Stop reason: HOSPADM

## 2022-05-09 RX ORDER — ONDANSETRON 2 MG/ML
4 INJECTION INTRAMUSCULAR; INTRAVENOUS
Status: DISCONTINUED | OUTPATIENT
Start: 2022-05-09 | End: 2022-05-10 | Stop reason: HOSPADM

## 2022-05-09 RX ORDER — FENTANYL CITRATE 50 UG/ML
INJECTION, SOLUTION INTRAMUSCULAR; INTRAVENOUS PRN
Status: DISCONTINUED | OUTPATIENT
Start: 2022-05-09 | End: 2022-05-09 | Stop reason: HOSPADM

## 2022-05-09 RX ORDER — FLUMAZENIL 0.1 MG/ML
0.2 INJECTION, SOLUTION INTRAVENOUS
Status: ACTIVE | OUTPATIENT
Start: 2022-05-09 | End: 2022-05-09

## 2022-05-09 RX ORDER — LIDOCAINE 40 MG/G
CREAM TOPICAL
Status: DISCONTINUED | OUTPATIENT
Start: 2022-05-09 | End: 2022-05-10 | Stop reason: HOSPADM

## 2022-05-09 RX ORDER — ONDANSETRON 2 MG/ML
4 INJECTION INTRAMUSCULAR; INTRAVENOUS EVERY 6 HOURS PRN
Status: DISCONTINUED | OUTPATIENT
Start: 2022-05-09 | End: 2022-05-10 | Stop reason: HOSPADM

## 2022-05-09 RX ORDER — NALOXONE HYDROCHLORIDE 0.4 MG/ML
0.2 INJECTION, SOLUTION INTRAMUSCULAR; INTRAVENOUS; SUBCUTANEOUS
Status: DISCONTINUED | OUTPATIENT
Start: 2022-05-09 | End: 2022-05-10 | Stop reason: HOSPADM

## 2022-05-09 RX ORDER — PROCHLORPERAZINE MALEATE 10 MG
10 TABLET ORAL EVERY 6 HOURS PRN
Status: DISCONTINUED | OUTPATIENT
Start: 2022-05-09 | End: 2022-05-10 | Stop reason: HOSPADM

## 2022-05-11 LAB
PATH REPORT.COMMENTS IMP SPEC: NORMAL
PATH REPORT.COMMENTS IMP SPEC: NORMAL
PATH REPORT.FINAL DX SPEC: NORMAL
PATH REPORT.GROSS SPEC: NORMAL
PATH REPORT.MICROSCOPIC SPEC OTHER STN: NORMAL
PATH REPORT.RELEVANT HX SPEC: NORMAL
PHOTO IMAGE: NORMAL

## 2022-06-18 ENCOUNTER — HOSPITAL ENCOUNTER (EMERGENCY)
Facility: CLINIC | Age: 62
Discharge: HOME | End: 2022-06-18
Attending: EMERGENCY MEDICINE | Admitting: EMERGENCY MEDICINE
Payer: COMMERCIAL

## 2022-06-18 ENCOUNTER — APPOINTMENT (OUTPATIENT)
Dept: GENERAL RADIOLOGY | Facility: CLINIC | Age: 62
End: 2022-06-18
Attending: EMERGENCY MEDICINE
Payer: COMMERCIAL

## 2022-06-18 VITALS
TEMPERATURE: 99 F | SYSTOLIC BLOOD PRESSURE: 140 MMHG | WEIGHT: 215 LBS | DIASTOLIC BLOOD PRESSURE: 85 MMHG | OXYGEN SATURATION: 96 % | RESPIRATION RATE: 21 BRPM | HEIGHT: 70 IN | BODY MASS INDEX: 30.78 KG/M2 | HEART RATE: 64 BPM

## 2022-06-18 DIAGNOSIS — R07.9 CHEST PAIN, UNSPECIFIED TYPE: ICD-10-CM

## 2022-06-18 LAB
ANION GAP SERPL CALCULATED.3IONS-SCNC: 4 MMOL/L (ref 3–14)
ATRIAL RATE - MUSE: 56 BPM
BASOPHILS # BLD AUTO: 0.1 10E3/UL (ref 0–0.2)
BASOPHILS NFR BLD AUTO: 1 %
BUN SERPL-MCNC: 18 MG/DL (ref 7–30)
CALCIUM SERPL-MCNC: 8.8 MG/DL (ref 8.5–10.1)
CHLORIDE BLD-SCNC: 106 MMOL/L (ref 94–109)
CO2 SERPL-SCNC: 28 MMOL/L (ref 20–32)
CREAT SERPL-MCNC: 1.03 MG/DL (ref 0.66–1.25)
DIASTOLIC BLOOD PRESSURE - MUSE: NORMAL MMHG
EOSINOPHIL # BLD AUTO: 0.2 10E3/UL (ref 0–0.7)
EOSINOPHIL NFR BLD AUTO: 3 %
ERYTHROCYTE [DISTWIDTH] IN BLOOD BY AUTOMATED COUNT: 13.2 % (ref 10–15)
GFR SERPL CREATININE-BSD FRML MDRD: 82 ML/MIN/1.73M2
GLUCOSE BLD-MCNC: 89 MG/DL (ref 70–99)
HCT VFR BLD AUTO: 42.2 % (ref 40–53)
HGB BLD-MCNC: 14.1 G/DL (ref 13.3–17.7)
IMM GRANULOCYTES # BLD: 0 10E3/UL
IMM GRANULOCYTES NFR BLD: 0 %
INTERPRETATION ECG - MUSE: NORMAL
LIPASE SERPL-CCNC: 143 U/L (ref 73–393)
LYMPHOCYTES # BLD AUTO: 2 10E3/UL (ref 0.8–5.3)
LYMPHOCYTES NFR BLD AUTO: 27 %
MCH RBC QN AUTO: 28.5 PG (ref 26.5–33)
MCHC RBC AUTO-ENTMCNC: 33.4 G/DL (ref 31.5–36.5)
MCV RBC AUTO: 85 FL (ref 78–100)
MONOCYTES # BLD AUTO: 0.6 10E3/UL (ref 0–1.3)
MONOCYTES NFR BLD AUTO: 8 %
NEUTROPHILS # BLD AUTO: 4.3 10E3/UL (ref 1.6–8.3)
NEUTROPHILS NFR BLD AUTO: 61 %
NRBC # BLD AUTO: 0 10E3/UL
NRBC BLD AUTO-RTO: 0 /100
P AXIS - MUSE: 49 DEGREES
PLATELET # BLD AUTO: 240 10E3/UL (ref 150–450)
POTASSIUM BLD-SCNC: 4 MMOL/L (ref 3.4–5.3)
PR INTERVAL - MUSE: 150 MS
QRS DURATION - MUSE: 98 MS
QT - MUSE: 406 MS
QTC - MUSE: 391 MS
R AXIS - MUSE: -4 DEGREES
RBC # BLD AUTO: 4.95 10E6/UL (ref 4.4–5.9)
SODIUM SERPL-SCNC: 138 MMOL/L (ref 133–144)
SYSTOLIC BLOOD PRESSURE - MUSE: NORMAL MMHG
T AXIS - MUSE: 36 DEGREES
TROPONIN I SERPL HS-MCNC: 4 NG/L
TROPONIN I SERPL HS-MCNC: <3 NG/L
VENTRICULAR RATE- MUSE: 56 BPM
WBC # BLD AUTO: 7.1 10E3/UL (ref 4–11)

## 2022-06-18 PROCEDURE — 36415 COLL VENOUS BLD VENIPUNCTURE: CPT | Performed by: EMERGENCY MEDICINE

## 2022-06-18 PROCEDURE — 85025 COMPLETE CBC W/AUTO DIFF WBC: CPT | Performed by: EMERGENCY MEDICINE

## 2022-06-18 PROCEDURE — 82435 ASSAY OF BLOOD CHLORIDE: CPT | Performed by: EMERGENCY MEDICINE

## 2022-06-18 PROCEDURE — 71046 X-RAY EXAM CHEST 2 VIEWS: CPT

## 2022-06-18 PROCEDURE — 93005 ELECTROCARDIOGRAM TRACING: CPT

## 2022-06-18 PROCEDURE — 83690 ASSAY OF LIPASE: CPT | Performed by: EMERGENCY MEDICINE

## 2022-06-18 PROCEDURE — 99285 EMERGENCY DEPT VISIT HI MDM: CPT | Mod: 25

## 2022-06-18 PROCEDURE — 84484 ASSAY OF TROPONIN QUANT: CPT | Performed by: EMERGENCY MEDICINE

## 2022-06-18 ASSESSMENT — ENCOUNTER SYMPTOMS
SHORTNESS OF BREATH: 0
VOMITING: 0
COUGH: 0
DIARRHEA: 0

## 2022-06-18 NOTE — ED TRIAGE NOTES
Patient here with chest pain which started 2 days ago.He denies having shortness of breath and no cough

## 2022-06-18 NOTE — ED PROVIDER NOTES
"  History   Chief Complaint:  Chest Pain       HPI   Ney Lester is a 62 year old male with history of hyperlipidemia who presents with left chest pain. The patient states that for the past 2 days he has had intermittent and random left lower chest pain that lasts for about an hour. The first time it happened he was sleeping and nothing makes it better or worse. He states that the pain is not sharp but just uncomfortable. He denies abdominal pain, vomiting, diarrhea, shortness of breath, rash, cough. Patient denies immobilization for >3 days or surgery within the previous 4 weeks, history of DVT or PE, hemoptysis, malignancy with treatment within previous 6 months or palliative therapy, or exogenous estrogen use.  His dad had a heart attack when he was about the patients age. He denies any trauma to the chest. He denies tobacco abuse.    Review of Systems   Respiratory: Negative for cough and shortness of breath.    Cardiovascular: Positive for chest pain. Negative for leg swelling.   Gastrointestinal: Negative for diarrhea and vomiting.   Skin: Negative for rash.   All other systems reviewed and are negative.    Allergies:  The patient has no known allergies.     Medications  citalopram   sildenafil     Past Medical History:     Alcoholism  Barling syndrome  Depression  Allergies  Overweight  Hyperlipidemia     Past Surgical History:    Wrist surgery     Family History:    Mother-COPD  Father-MI, CAD    Social History:  The patient presents to the ED alone.    Physical Exam     Patient Vitals for the past 24 hrs:   BP Temp Temp src Pulse Resp SpO2 Height Weight   06/18/22 1526 125/69 99  F (37.2  C) Temporal 69 12 98 % 1.778 m (5' 10\") 97.5 kg (215 lb)       Physical Exam  VS: Reviewed per above  HENT: normal speech  EYES: sclera anicteric  CV: Rate as noted, regular rhythm.   RESP: Effort normal. Breath sounds are normal bilaterally.  GI: no tenderness/rebound/guarding, not distended.  NEURO: Alert, moving all " extremities  MSK: No deformity of the extremities  SKIN: Warm and dry    Emergency Department Course   ECG  ECG results from 06/18/22   EKG 12-lead, tracing only     Value    Systolic Blood Pressure     Diastolic Blood Pressure     Ventricular Rate 56    Atrial Rate 56    IA Interval 150    QRS Duration 98        QTc 391    P Axis 49    R AXIS -4    T Axis 36    Interpretation ECG      Sinus bradycardia  Otherwise normal ECG  No previous ECGs available  Confirmed by GENERATED REPORT, COMPUTER ([id number]),  Maureen Molina ([id number]) on 6/18/2022 3:41:49 PM         Imaging:  Chest XR,  PA & LAT   Final Result   IMPRESSION: Negative chest.      Echo Stress Echocardiogram    (Results Pending)     Report per radiology    Laboratory:  Labs Ordered and Resulted from Time of ED Arrival to Time of ED Departure   BASIC METABOLIC PANEL - Normal       Result Value    Sodium 138      Potassium 4.0      Chloride 106      Carbon Dioxide (CO2) 28      Anion Gap 4      Urea Nitrogen 18      Creatinine 1.03      Calcium 8.8      Glucose 89      GFR Estimate 82     TROPONIN I - Normal    Troponin I High Sensitivity <3     LIPASE - Normal    Lipase 143     TROPONIN I - Normal    Troponin I High Sensitivity 4     CBC WITH PLATELETS AND DIFFERENTIAL    WBC Count 7.1      RBC Count 4.95      Hemoglobin 14.1      Hematocrit 42.2      MCV 85      MCH 28.5      MCHC 33.4      RDW 13.2      Platelet Count 240      % Neutrophils 61      % Lymphocytes 27      % Monocytes 8      % Eosinophils 3      % Basophils 1      % Immature Granulocytes 0      NRBCs per 100 WBC 0      Absolute Neutrophils 4.3      Absolute Lymphocytes 2.0      Absolute Monocytes 0.6      Absolute Eosinophils 0.2      Absolute Basophils 0.1      Absolute Immature Granulocytes 0.0      Absolute NRBCs 0.0        Emergency Department Course:         Reviewed:  I reviewed nursing notes, vitals, past medical history and Care Everywhere    Assessments:  1615 I obtained history and  examined the patient as noted above.   1826 I rechecked the patient and explained findings.     Disposition:  The patient was discharged to home.     Impression & Plan     Medical Decision Making:  Ney Lester presented to the ER with chest pain. CXR did not reveal wide mediastinum and nature of pain not c/w aortic dissection. No radiographic evidence of pneumothorax nor PNA either. Hx and lack of pneumomediastinum on CXR make boerhaave's syndrome unlikely.  Based on history and lack of significant risk factors and lack of shortness of breath, PE was deemed unlikely. ECG did not show signs of STEMI nor other specific signs of ischemia. Serial troponin testing was negative, thus no signs of acute MI. Hx is also not consistent with unstable angina. Nature of pain in conjunction with reassuring ECG and negative troponin makes pericarditis and myocarditis unlikely as well.  Patient does have some cardiac risk factors and thus despite atypical nature of this pain, I will order outpatient stress test.  In the meantime, patient will take baby aspirin daily until he is able to follow-up with his primary care physician.    Diagnosis:    ICD-10-CM    1. Chest pain, unspecified type  R07.9 Echo Stress Echocardiogram       Discharge Medications:  New Prescriptions    No medications on file       Scribe Disclosure:  I, John Cottrell, am serving as a scribe at 4:10 PM on 6/18/2022 to document services personally performed by Colton Lujan MD based on my observations and the provider's statements to me.              Colton Lujan MD  06/18/22 2003

## 2022-06-23 ENCOUNTER — OFFICE VISIT (OUTPATIENT)
Dept: URGENT CARE | Facility: URGENT CARE | Age: 62
End: 2022-06-23
Payer: COMMERCIAL

## 2022-06-23 VITALS
WEIGHT: 219 LBS | BODY MASS INDEX: 31.42 KG/M2 | SYSTOLIC BLOOD PRESSURE: 116 MMHG | TEMPERATURE: 98.3 F | DIASTOLIC BLOOD PRESSURE: 73 MMHG | HEART RATE: 78 BPM | OXYGEN SATURATION: 99 %

## 2022-06-23 DIAGNOSIS — B02.9 HERPES ZOSTER WITHOUT COMPLICATION: Primary | ICD-10-CM

## 2022-06-23 PROCEDURE — 99213 OFFICE O/P EST LOW 20 MIN: CPT | Performed by: EMERGENCY MEDICINE

## 2022-06-23 RX ORDER — VALACYCLOVIR HYDROCHLORIDE 1 G/1
1000 TABLET, FILM COATED ORAL 3 TIMES DAILY
Qty: 21 TABLET | Refills: 0 | Status: SHIPPED | OUTPATIENT
Start: 2022-06-23 | End: 2022-08-26

## 2022-06-23 RX ORDER — PREDNISONE 20 MG/1
40 TABLET ORAL DAILY
Qty: 10 TABLET | Refills: 0 | Status: SHIPPED | OUTPATIENT
Start: 2022-06-23 | End: 2022-06-28

## 2022-06-23 NOTE — PROGRESS NOTES
Assessment & Plan     Diagnosis:    (B02.9) Herpes zoster without complication  (primary encounter diagnosis)  Plan: valACYclovir (VALTREX) 1000 mg tablet,         predniSONE (DELTASONE) 20 MG tablet      Medical Decision Making:  Ney Lester is a 62 year old male who presents for evaluation of painful rash on the left back/chest wall.  This is consistent clinically with herpes zoster.  Will start valacyclovir for this and pain medicine as noted above.  No signs at this point of disseminated zoster nor V1/eye involvement.  Patient is not immunocompromised and I see no signs of systemic illness that might have lead to this.  I will not get lab work to look for things like HIV or leukemia therefore.  See primary care doctor in follow up for recheck in 1 week.  Discussed signs and symptoms to warrant going to the ER.  Patient voices understanding and agreement with the plan.  All questions answered.    Pepito Zaragoza PA-C  [facility]    Subjective     Ney Lester is a 62 year old male who presents to clinic today for the following health issues:  Chief Complaint   Patient presents with     Shingles     Symptoms started yesterday . Used anti itch cream for relief        HPI    Rash    Onset of rash was 3 day(s) ago.   Course of illness is gradual onset.  Severity moderate  Current and Associated symptoms: itching, painful, red and blistering   Location of the rash: Left side of back and anterior chest.  Notes this started off his pain in his chest and left back region, was seen in the ER last week for this but had an unremarkable work-up.  Previous history of a similar rash? No  Recent exposure history: none known  Denies exposure to: medications, new household products, new skin care products and poison ivy  Associated symptoms include: None    Denies any throat tightness, sore throat, wheezing, shortness of breath, tongue/lip swelling and fever, rash involving the face or eyes, vision  changes, ear pain or hearing changes.    Review of Systems    See HPI    Objective      Vitals: /73   Pulse 78   Temp 98.3  F (36.8  C) (Tympanic)   Wt 99.3 kg (219 lb)   SpO2 99%   BMI 31.42 kg/m    Resp: 16    Patient Vitals for the past 24 hrs:   BP Temp Temp src Pulse SpO2 Weight   06/23/22 1112 116/73 98.3  F (36.8  C) Tympanic 78 99 % 99.3 kg (219 lb)       Vital signs reviewed by: Pepito Zaragoza PA-C    Physical Exam   Constitutional: Patient is alert and cooperative. No acute distress.  Ears: Right TM is clear. Left TM is clear. External ear canals are normal.  Mouth: Mucous membranes are moist. Normal tongue and tonsil. Posterior oropharynx is clear.  Eyes: Conjunctivae, EOMI and lids are normal. PERRL.  Cardiovascular: Regular rate and rhythm  Pulmonary/Chest: Lungs are clear to auscultation throughout. Effort normal. No respiratory distress. No wheezes, rales or rhonchi.  Neurological: Alert and oriented x3. CN 3-7 and 9-12 intact. Strength and sensation are intact and symmetric in the bilateral upper and lower extremities.   Skin: Vesicular rash following dermatome from the left side of the back to the anterior chest.  No blisters or honey crusted lesions.  No petechiae or purpura.  No facial or mucous membrane involvement.  Psychiatric:The patient has a normal mood and affect.       Pepito Zaragoza PA-C, June 23, 2022

## 2022-06-27 DIAGNOSIS — F41.9 ANXIETY AND DEPRESSION: ICD-10-CM

## 2022-06-27 DIAGNOSIS — F32.A ANXIETY AND DEPRESSION: ICD-10-CM

## 2022-06-28 NOTE — TELEPHONE ENCOUNTER
Routing refill request to provider for review/approval because:    PHQ-9 > 4  PHQ 2/1/2022   PHQ-9 Total Score 7   Q9: Thoughts of better off dead/self-harm past 2 weeks Not at all     TAMI CheathamN, RN

## 2022-06-29 RX ORDER — CITALOPRAM HYDROBROMIDE 20 MG/1
20 TABLET ORAL DAILY
Qty: 90 TABLET | Refills: 1 | Status: SHIPPED | OUTPATIENT
Start: 2022-06-29 | End: 2023-02-05

## 2022-07-06 DIAGNOSIS — N52.9 ERECTILE DYSFUNCTION, UNSPECIFIED ERECTILE DYSFUNCTION TYPE: ICD-10-CM

## 2022-07-06 RX ORDER — SILDENAFIL CITRATE 20 MG/1
TABLET ORAL
Qty: 30 TABLET | Refills: 1 | Status: SHIPPED | OUTPATIENT
Start: 2022-07-06 | End: 2023-02-01

## 2022-08-26 ENCOUNTER — OFFICE VISIT (OUTPATIENT)
Dept: FAMILY MEDICINE | Facility: CLINIC | Age: 62
End: 2022-08-26
Payer: COMMERCIAL

## 2022-08-26 VITALS
HEART RATE: 52 BPM | BODY MASS INDEX: 30.92 KG/M2 | OXYGEN SATURATION: 95 % | SYSTOLIC BLOOD PRESSURE: 118 MMHG | TEMPERATURE: 97.6 F | WEIGHT: 216 LBS | HEIGHT: 70 IN | DIASTOLIC BLOOD PRESSURE: 74 MMHG

## 2022-08-26 DIAGNOSIS — M54.50 MIDLINE LOW BACK PAIN WITHOUT SCIATICA, UNSPECIFIED CHRONICITY: Primary | ICD-10-CM

## 2022-08-26 DIAGNOSIS — M25.559 HIP PAIN: ICD-10-CM

## 2022-08-26 PROCEDURE — 99213 OFFICE O/P EST LOW 20 MIN: CPT | Performed by: FAMILY MEDICINE

## 2022-08-26 RX ORDER — MELOXICAM 15 MG/1
15 TABLET ORAL DAILY
Qty: 20 TABLET | Refills: 0 | Status: SHIPPED | OUTPATIENT
Start: 2022-08-26 | End: 2022-09-12

## 2022-08-26 RX ORDER — CYCLOBENZAPRINE HCL 10 MG
10 TABLET ORAL 3 TIMES DAILY PRN
Qty: 30 TABLET | Refills: 0 | Status: SHIPPED | OUTPATIENT
Start: 2022-08-26 | End: 2022-09-05

## 2022-08-26 ASSESSMENT — PAIN SCALES - GENERAL: PAINLEVEL: MODERATE PAIN (5)

## 2022-08-26 NOTE — PROGRESS NOTES
"  Assessment & Plan     Midline low back pain without sciatica, unspecified chronicity    - XR Lumbar Spine 2/3 Views; Future  - cyclobenzaprine (FLEXERIL) 10 MG tablet; Take 1 tablet (10 mg) by mouth 3 times daily as needed for muscle spasms  - meloxicam (MOBIC) 15 MG tablet; Take 1 tablet (15 mg) by mouth daily  - Physical Therapy Referral; Future    Hip pain    - XR Lumbar Spine 2/3 Views; Future  - cyclobenzaprine (FLEXERIL) 10 MG tablet; Take 1 tablet (10 mg) by mouth 3 times daily as needed for muscle spasms  - meloxicam (MOBIC) 15 MG tablet; Take 1 tablet (15 mg) by mouth daily  - Physical Therapy Referral; Marjorie Lester is a 62 year old male who presents today for back pain and bilateral hip pain. No red flags , likely related to osteoarthritis     1. Patient education: In depth discussion and education was provided about the assessment and implications of each of the below recommendations for management. Patient indicated readiness to learn, all questions were answered and understanding of material presented was confirmed.  2. Work-up:X ray lumbar spine   3. Therapy/equipment/braces: PT  4. Medications: Mobic an dflexeril    5. Interventions: none  6. Referral / follow up with other providers: PT  7. Follow up:  6 weeks              BMI:   Estimated body mass index is 30.99 kg/m  as calculated from the following:    Height as of this encounter: 1.778 m (5' 10\").    Weight as of this encounter: 98 kg (216 lb).           Return in about 6 weeks (around 10/7/2022).    Ousmane Wade MD  [facility]    Heber Brewster is a 62 year old, presenting for the following health issues:  No chief complaint on file.      History of Present Illness       Back Pain:  He presents for follow up of back pain. Patient's back pain is a new problem.    Original cause of back pain: turning/bending  First noticed back pain: more than 1 month ago  Patient feels back pain: comes and goesLocation of " back pain:  Right lower back  Description of back pain: dull ache  Back pain spreads: nowhere    Since patient first noticed back pain, pain is: unchanged  Does back pain interfere with his job:  No  On a scale of 1-10 (10 being the worst), patient describes pain as:  5  What makes back pain worse: nothing  Acupuncture: not tried  Acetaminophen: not tried  Activity or exercise: helpful  Chiropractor:  Helpful  Cold: not tried  Heat: not tried  Massage: not tried  Muscle relaxants: not tried  NSAIDS: not tried  Opioids: not tried  Physical Therapy: not tried  Rest: not tried  TENS unit: not tried  Topical pain relievers: not tried  Other healthcare providers patient is seeing for back pain: Chiropractor    Reason for visit:  Just a general check up    He eats 2-3 servings of fruits and vegetables daily.He consumes 1 sweetened beverage(s) daily.He exercises with enough effort to increase his heart rate 20 to 29 minutes per day.  He exercises with enough effort to increase his heart rate 4 days per week.   He is taking medications regularly.     Back pain started 6-7  months ago , no injury or trauma   Pain is aggravated by walking , alleviated by resting          Review of Systems         Objective    There were no vitals taken for this visit.  There is no height or weight on file to calculate BMI.  Physical Exam  Vitals and nursing note reviewed.   Constitutional:       General: He is not in acute distress.     Appearance: Normal appearance. He is not ill-appearing, toxic-appearing or diaphoretic.   HENT:      Head: Normocephalic and atraumatic.   Cardiovascular:      Rate and Rhythm: Normal rate and regular rhythm.      Heart sounds: No murmur heard.    No gallop.   Musculoskeletal:      Cervical back: Normal range of motion.      Lumbar back: No swelling, edema, deformity, signs of trauma, lacerations, spasms, tenderness or bony tenderness. Normal range of motion. Negative right straight leg raise test and negative  left straight leg raise test. No scoliosis.   Neurological:      Mental Status: He is alert.                            .  ..

## 2022-08-27 ENCOUNTER — HEALTH MAINTENANCE LETTER (OUTPATIENT)
Age: 62
End: 2022-08-27

## 2022-09-01 DIAGNOSIS — M54.50 MIDLINE LOW BACK PAIN WITHOUT SCIATICA, UNSPECIFIED CHRONICITY: ICD-10-CM

## 2022-09-01 DIAGNOSIS — M25.559 HIP PAIN: ICD-10-CM

## 2022-09-05 RX ORDER — CYCLOBENZAPRINE HCL 10 MG
10 TABLET ORAL 3 TIMES DAILY PRN
Qty: 30 TABLET | Refills: 0 | Status: SHIPPED | OUTPATIENT
Start: 2022-09-05

## 2022-09-11 DIAGNOSIS — M25.559 HIP PAIN: ICD-10-CM

## 2022-09-11 DIAGNOSIS — M54.50 MIDLINE LOW BACK PAIN WITHOUT SCIATICA, UNSPECIFIED CHRONICITY: ICD-10-CM

## 2022-09-12 RX ORDER — MELOXICAM 15 MG/1
15 TABLET ORAL DAILY
Qty: 20 TABLET | Refills: 0 | Status: SHIPPED | OUTPATIENT
Start: 2022-09-12

## 2022-12-26 ENCOUNTER — HEALTH MAINTENANCE LETTER (OUTPATIENT)
Age: 62
End: 2022-12-26

## 2023-01-01 NOTE — TELEPHONE ENCOUNTER
Left message on answering machine for patient/parent to call back.   [phone]  Benita Urban RN       
Ney called back and was informed of the results as written below. Also reviewed the STI results with the patient as they were non reactive.    Will call back to schedule a lab in 6 months.    Bridget Cochran RN, [facility]      
No provider note on results.  Jewell Collado BSN, RN    
The patients girlfriend Christiana is requesting that Dr. Wade's care team call the patient with his recent test results.  Please call the patient at [phone] and advise.  Thank you.  Bessie Yañez,  [facility]    
Your PSA was very mildly elevated.in fact is just slightly above normal range  This is often caused by some low level inflammation of the prostate but in some cases, this can be an indication of prostate cancer. At this point I recommend repeating PSA level again in 6  Months . If it continues to be elevated will refer to urology to consider prostate biopsy        The results of your recent lipid (cholesterol) profile showed mild elevation of LDL or bad cholesterol     Please follow the recommendations below and schedule a lab only appointment ([phone]) in 6 months for a repeat fasting test. Please don't have anything to eat or drink (except water) for 8 hours prior to the test.    As you may know, an elevated cholesterol is one factor that increases your risk for heart disease and stroke. You can improve your cholesterol by controlling the amount and type of fat you eat and be increasing your daily activity level.    Here are some ways to improve your nutrition (adapted from the [employer] handout):  Eat less fat (especially butter, Crisco and other saturated fats)  Buy lean cuts of meat, reduce your portions of red meat or substitute poultry or fish  Use skim milk and low-fat dairy products  Eat no more than 4 egg yolks per week  Avoid fried or fast foods that are high in fat  Eat more fruits and vegetables    Also consider starting or increasing your aerobic activity. Aerobic activity is the best way to improve HDL (good) cholesterol. If this would be new to you, please talk with me first about what activities are safe for you.    Please feel free to call us with any questions or if you would like more information.          
2

## 2023-02-01 DIAGNOSIS — N52.9 ERECTILE DYSFUNCTION, UNSPECIFIED ERECTILE DYSFUNCTION TYPE: ICD-10-CM

## 2023-02-01 DIAGNOSIS — F32.A ANXIETY AND DEPRESSION: ICD-10-CM

## 2023-02-01 DIAGNOSIS — F41.9 ANXIETY AND DEPRESSION: ICD-10-CM

## 2023-02-01 RX ORDER — SILDENAFIL CITRATE 20 MG/1
TABLET ORAL
Qty: 30 TABLET | Refills: 1 | Status: SHIPPED | OUTPATIENT
Start: 2023-02-01 | End: 2023-11-24

## 2023-02-05 RX ORDER — CITALOPRAM HYDROBROMIDE 20 MG/1
20 TABLET ORAL DAILY
Qty: 90 TABLET | Refills: 1 | Status: SHIPPED | OUTPATIENT
Start: 2023-02-05 | End: 2023-11-28

## 2023-03-13 ENCOUNTER — OFFICE VISIT (OUTPATIENT)
Dept: FAMILY MEDICINE | Facility: CLINIC | Age: 63
End: 2023-03-13
Payer: COMMERCIAL

## 2023-03-13 VITALS
HEIGHT: 70 IN | RESPIRATION RATE: 16 BRPM | DIASTOLIC BLOOD PRESSURE: 63 MMHG | TEMPERATURE: 98.6 F | SYSTOLIC BLOOD PRESSURE: 115 MMHG | OXYGEN SATURATION: 95 % | HEART RATE: 61 BPM | BODY MASS INDEX: 32.35 KG/M2 | WEIGHT: 226 LBS

## 2023-03-13 DIAGNOSIS — R35.1 BENIGN PROSTATIC HYPERPLASIA WITH NOCTURIA: ICD-10-CM

## 2023-03-13 DIAGNOSIS — Z12.83 SKIN CANCER SCREENING: ICD-10-CM

## 2023-03-13 DIAGNOSIS — E78.5 HYPERLIPIDEMIA LDL GOAL <100: ICD-10-CM

## 2023-03-13 DIAGNOSIS — Z12.5 SCREENING FOR PROSTATE CANCER: ICD-10-CM

## 2023-03-13 DIAGNOSIS — N40.1 BENIGN PROSTATIC HYPERPLASIA WITH NOCTURIA: ICD-10-CM

## 2023-03-13 DIAGNOSIS — Z00.00 ROUTINE GENERAL MEDICAL EXAMINATION AT A HEALTH CARE FACILITY: Primary | ICD-10-CM

## 2023-03-13 DIAGNOSIS — R59.0 SUPRACLAVICULAR LYMPHADENOPATHY: ICD-10-CM

## 2023-03-13 LAB
ALBUMIN SERPL-MCNC: 3.7 G/DL (ref 3.4–5)
ALP SERPL-CCNC: 75 U/L (ref 40–150)
ALT SERPL W P-5'-P-CCNC: 18 U/L (ref 0–70)
ANION GAP SERPL CALCULATED.3IONS-SCNC: 1 MMOL/L (ref 3–14)
AST SERPL W P-5'-P-CCNC: 4 U/L (ref 0–45)
BILIRUB SERPL-MCNC: 0.5 MG/DL (ref 0.2–1.3)
BUN SERPL-MCNC: 16 MG/DL (ref 7–30)
CALCIUM SERPL-MCNC: 9.3 MG/DL (ref 8.5–10.1)
CHLORIDE BLD-SCNC: 108 MMOL/L (ref 94–109)
CHOLEST SERPL-MCNC: 262 MG/DL
CO2 SERPL-SCNC: 31 MMOL/L (ref 20–32)
CREAT SERPL-MCNC: 0.95 MG/DL (ref 0.66–1.25)
FASTING STATUS PATIENT QL REPORTED: YES
GFR SERPL CREATININE-BSD FRML MDRD: 90 ML/MIN/1.73M2
GLUCOSE BLD-MCNC: 101 MG/DL (ref 70–99)
HBA1C MFR BLD: 5.5 % (ref 0–5.6)
HDLC SERPL-MCNC: 49 MG/DL
LDLC SERPL CALC-MCNC: 181 MG/DL
NONHDLC SERPL-MCNC: 213 MG/DL
POTASSIUM BLD-SCNC: 4.5 MMOL/L (ref 3.4–5.3)
PROT SERPL-MCNC: 7.3 G/DL (ref 6.8–8.8)
PSA SERPL-MCNC: 4.76 UG/L (ref 0–4)
SODIUM SERPL-SCNC: 140 MMOL/L (ref 133–144)
TRIGL SERPL-MCNC: 162 MG/DL

## 2023-03-13 PROCEDURE — 99213 OFFICE O/P EST LOW 20 MIN: CPT | Mod: 25 | Performed by: FAMILY MEDICINE

## 2023-03-13 PROCEDURE — 36415 COLL VENOUS BLD VENIPUNCTURE: CPT | Performed by: FAMILY MEDICINE

## 2023-03-13 PROCEDURE — 99396 PREV VISIT EST AGE 40-64: CPT | Performed by: FAMILY MEDICINE

## 2023-03-13 PROCEDURE — 80053 COMPREHEN METABOLIC PANEL: CPT | Performed by: FAMILY MEDICINE

## 2023-03-13 PROCEDURE — G0103 PSA SCREENING: HCPCS | Performed by: FAMILY MEDICINE

## 2023-03-13 PROCEDURE — 83036 HEMOGLOBIN GLYCOSYLATED A1C: CPT | Performed by: FAMILY MEDICINE

## 2023-03-13 PROCEDURE — 80061 LIPID PANEL: CPT | Performed by: FAMILY MEDICINE

## 2023-03-13 RX ORDER — TAMSULOSIN HYDROCHLORIDE 0.4 MG/1
0.4 CAPSULE ORAL DAILY
Qty: 90 CAPSULE | Refills: 1 | Status: SHIPPED | OUTPATIENT
Start: 2023-03-13 | End: 2023-11-24

## 2023-03-13 ASSESSMENT — ENCOUNTER SYMPTOMS
WEAKNESS: 0
SORE THROAT: 0
PALPITATIONS: 0
ARTHRALGIAS: 1
HEARTBURN: 0
JOINT SWELLING: 0
CONSTIPATION: 0
COUGH: 0
HEADACHES: 0
FREQUENCY: 1
HEMATURIA: 0
NERVOUS/ANXIOUS: 0
DIARRHEA: 0
SHORTNESS OF BREATH: 0
NAUSEA: 0
PARESTHESIAS: 0
DIZZINESS: 0
ABDOMINAL PAIN: 0
DYSURIA: 0
EYE PAIN: 0
HEMATOCHEZIA: 0
FEVER: 0
MYALGIAS: 1
CHILLS: 0

## 2023-03-13 ASSESSMENT — PATIENT HEALTH QUESTIONNAIRE - PHQ9
SUM OF ALL RESPONSES TO PHQ QUESTIONS 1-9: 2
SUM OF ALL RESPONSES TO PHQ QUESTIONS 1-9: 2
10. IF YOU CHECKED OFF ANY PROBLEMS, HOW DIFFICULT HAVE THESE PROBLEMS MADE IT FOR YOU TO DO YOUR WORK, TAKE CARE OF THINGS AT HOME, OR GET ALONG WITH OTHER PEOPLE: NOT DIFFICULT AT ALL

## 2023-03-13 ASSESSMENT — PAIN SCALES - GENERAL: PAINLEVEL: NO PAIN (0)

## 2023-03-13 NOTE — PROGRESS NOTES
SUBJECTIVE:   CC: Ney is an 63 year old who presents for preventative health visit.   Patient has been advised of split billing requirements and indicates understanding: Yes  Healthy Habits:     Getting at least 3 servings of Calcium per day:  Yes    Bi-annual eye exam:  Yes    Dental care twice a year:  NO    Sleep apnea or symptoms of sleep apnea:  Daytime drowsiness    Diet:  Regular (no restrictions)    Frequency of exercise:  6-7 days/week    Duration of exercise:  15-30 minutes    Taking medications regularly:  Yes    Medication side effects:  None    PHQ-2 Total Score: 1    Additional concerns today:  No    Wt Readings from Last 4 Encounters:   03/13/23 102.5 kg (226 lb)   08/26/22 98 kg (216 lb)   06/23/22 99.3 kg (219 lb)   06/18/22 97.5 kg (215 lb)       Answers for HPI/ROS submitted by the patient on 3/13/2023  If you checked off any problems, how difficult have these problems made it for you to do your work, take care of things at home, or get along with other people?: Not difficult at all  PHQ9 TOTAL SCORE: 2    Preventive - advised to get Shingrix from our pharmacy.     Immunization History   Administered Date(s) Administered     COVID-19 Vaccine 12+ (Pfizer) 01/26/2022, 02/16/2022     FLU 6-35 months 10/01/2009     Influenza (IIV3) PF 11/19/2007, 02/12/2009     Influenza Vaccine 50-64 or 18-64 w/egg allergy (Flublok) 02/01/2022     Td (Adult), Adsorbed 07/26/1999     Tdap (Adacel,Boostrix) 02/12/2009, 02/01/2022         - Colon CA screen: Colonoscopy, age 45-75 every 10 years or FIT every year or Cologuard every 3 years   Impression:               - Hemorrhoids found on perianal exam.                             - Diverticulosis in the entire examined colon.                             - Two 1 to 5 mm polyps in the ascending colon,                             removed with a cold snare. Resected and retrieved.   Recommendation:           - Patient has a contact number available for                              emergencies. The signs and symptoms of potential                             delayed complications were discussed with the                             patient. Return to normal activities tomorrow.                             Written discharge instructions were provided to the                             patient.                             - Resume previous diet.                             - Continue present medications.                             - Await pathology results.                             - Repeat colonoscopy in 7-10 years for surveillance                             based on pathology results  Next 2029     - Prostate CA screen: Discussed controversy about screening.   PSA   Date Value Ref Range Status   07/02/2021 4.05 (H) 0 - 4 ug/L Final     Comment:     Assay Method:  Chemiluminescence using Siemens Vista analyzer     Prostate Specific Antigen Screen   Date Value Ref Range Status   02/01/2022 4.60 (H) 0.00 - 4.00 ug/L Final       - Lung cancer screen: IMG 2290 - Asymptomatic, age 55 - 79 years, Current or Former Smoker; if former, must have quit in past 15 years, 30 + pack-year smoking history. Beta carotene use in smokers has been associated with higher risk of lung CA.  Never smoked     -lipids screen: ordered     Diabetes screen: ordered           Today's PHQ-2 Score:   PHQ-2 ( 1999 Pfizer) 3/13/2023   Q1: Little interest or pleasure in doing things 0   Q2: Feeling down, depressed or hopeless 1   PHQ-2 Score 1   PHQ-2 Total Score (12-17 Years)- Positive if 3 or more points; Administer PHQ-A if positive -   Q1: Little interest or pleasure in doing things Not at all   Q2: Feeling down, depressed or hopeless Several days   PHQ-2 Score 1         Social History     Tobacco Use     Smoking status: Never     Smokeless tobacco: Never   Substance Use Topics     Alcohol use: Not Currently         Alcohol Use 3/13/2023   Prescreen: >3 drinks/day or >7 drinks/week? No   No flowsheet data  found.    Last PSA:   PSA   Date Value Ref Range Status   07/02/2021 4.05 (H) 0 - 4 ug/L Final     Comment:     Assay Method:  Chemiluminescence using Siemens Vista analyzer     Prostate Specific Antigen Screen   Date Value Ref Range Status   02/01/2022 4.60 (H) 0.00 - 4.00 ug/L Final       Reviewed orders with patient. Reviewed health maintenance and updated orders accordingly - Yes  Lab work is in process  Labs reviewed in EPIC  BP Readings from Last 3 Encounters:   03/13/23 115/63   08/26/22 118/74   06/23/22 116/73    Wt Readings from Last 3 Encounters:   03/13/23 102.5 kg (226 lb)   08/26/22 98 kg (216 lb)   06/23/22 99.3 kg (219 lb)                  Patient Active Problem List   Diagnosis     CARDIOVASCULAR SCREENING; LDL GOAL LESS THAN 160     Alcoholism (H)     Past Surgical History:   Procedure Laterality Date     COLONOSCOPY N/A 5/9/2022    Procedure: COLONOSCOPY, FLEXIBLE, WITH LESION REMOVAL USING SNARE;  Surgeon: Grant Land DO;  Location: [facility]     COLONOSCOPY WITH CO2 INSUFFLATION N/A 5/9/2022    Procedure: COLONOSCOPY, WITH CO2 INSUFFLATION;  Surgeon: Grant Land DO;  Location: [facility]     WRIST SURGERY Left        Social History     Tobacco Use     Smoking status: Never     Smokeless tobacco: Never   Substance Use Topics     Alcohol use: Not Currently     Family History   Problem Relation Age of Onset     Chronic Obstructive Pulmonary Disease Mother          Current Outpatient Medications   Medication Sig Dispense Refill     citalopram (CELEXA) 20 MG tablet Take 1 tablet (20 mg) by mouth daily 90 tablet 1     sildenafil (REVATIO) 20 MG tablet Take 1-5 tabs prior to intercourse on empty stomach as needed 30 tablet 1     tamsulosin (FLOMAX) 0.4 MG capsule Take 1 capsule (0.4 mg) by mouth daily 90 capsule 1     cyclobenzaprine (FLEXERIL) 10 MG tablet Take 1 tablet (10 mg) by mouth 3 times daily as needed for muscle spasms (Patient not taking: Reported on 3/13/2023) 30 tablet 0     meloxicam  (MOBIC) 15 MG tablet Take 1 tablet (15 mg) by mouth daily (Patient not taking: Reported on 3/13/2023) 20 tablet 0     No Known Allergies  Recent Labs   Lab Test 03/13/23  1007 06/18/22  1546 02/01/22  1110 07/02/21  0820   A1C 5.5  --   --   --    LDL  --   --  148* 142*   HDL  --   --  49 60   TRIG  --   --  135 109   ALT  --   --  28  --    CR  --  1.03 0.99  --    GFRESTIMATED  --  82 86  --    POTASSIUM  --  4.0 4.1  --         Reviewed and updated as needed this visit by clinical staff   Tobacco  Allergies  Meds              Reviewed and updated as needed this visit by Provider                 Past Medical History:   Diagnosis Date     Anxiety      Hyperlipidemia       Past Surgical History:   Procedure Laterality Date     COLONOSCOPY N/A 5/9/2022    Procedure: COLONOSCOPY, FLEXIBLE, WITH LESION REMOVAL USING SNARE;  Surgeon: Grant Land DO;  Location: [facility]     COLONOSCOPY WITH CO2 INSUFFLATION N/A 5/9/2022    Procedure: COLONOSCOPY, WITH CO2 INSUFFLATION;  Surgeon: Grant Land DO;  Location: [facility]     WRIST SURGERY Left        Review of Systems   Constitutional: Negative for chills and fever.   HENT: Negative for congestion, ear pain, hearing loss and sore throat.    Eyes: Negative for pain and visual disturbance.   Respiratory: Negative for cough and shortness of breath.    Cardiovascular: Negative for chest pain, palpitations and peripheral edema.   Gastrointestinal: Negative for abdominal pain, constipation, diarrhea, heartburn, hematochezia and nausea.   Genitourinary: Positive for frequency and urgency. Negative for dysuria, genital sores, hematuria, impotence and penile discharge.   Musculoskeletal: Positive for arthralgias and myalgias. Negative for joint swelling.   Skin: Negative for rash.   Neurological: Negative for dizziness, weakness, headaches and paresthesias.   Psychiatric/Behavioral: Negative for mood changes. The patient is not nervous/anxious.          OBJECTIVE:   /63   " Pulse 61   Temp 98.6  F (37  C) (Tympanic)   Resp 16   Ht 1.778 m (5' 10\")   Wt 102.5 kg (226 lb)   SpO2 95%   BMI 32.43 kg/m      Physical Exam  Vitals and nursing note reviewed.   Constitutional:       General: He is not in acute distress.     Appearance: Normal appearance. He is not ill-appearing, toxic-appearing or diaphoretic.   HENT:      Head: Normocephalic and atraumatic.   Neck:     Cardiovascular:      Rate and Rhythm: Normal rate and regular rhythm.      Pulses: Normal pulses.      Heart sounds: No murmur heard.    No friction rub. No gallop.   Pulmonary:      Effort: Pulmonary effort is normal. No respiratory distress.      Breath sounds: Normal breath sounds. No stridor. No wheezing or rhonchi.   Musculoskeletal:      Cervical back: Full passive range of motion without pain, normal range of motion and neck supple. No rigidity or tenderness.   Lymphadenopathy:      Cervical: Cervical adenopathy present.   Skin:     Capillary Refill: Capillary refill takes less than 2 seconds.   Neurological:      General: No focal deficit present.      Mental Status: He is alert.               ASSESSMENT/PLAN:   (Z00.00) Routine general medical examination at a health care facility  (primary encounter diagnosis)  Comment: Preventive care reviewed and updated.    Plan: Comprehensive metabolic panel (BMP + Alb, Alk         Phos, ALT, AST, Total. Bili, TP), Hemoglobin         A1c, Lipid panel reflex to direct LDL Fasting           -We discussed recommendation of 150 min moderate intensity exercise /week   - We discussed the need for heart healthy diet including a diet rich in fruits, vegetables and fiber and very low on carbonated beverages sugar  - We discussed recommendation of moderation of alcohol, salt and NSAIDs.  Reviewed principles of energy metabolism, caloric intake and   expenditure, and rationale for treatment program. Also reinforced   need for reduced calorie, low fat diet and increased physical " "  activity.    (Z12.5) Screening for prostate cancer    Plan: PSA, screen         (N40.1,  R35.1) Benign prostatic hyperplasia with nocturia  Comment: symptoms include nocturia   Plan: tamsulosin (FLOMAX) 0.4 MG capsule       He has hx of elevated PSA , MRI showed no cancer   will start Flomax   (R59.0) Supraclavicular lymphadenopathy  Comment: noted on exam last year , had neck CT showed :  Asymmetric soft tissue nodularity in the left supraclavicular fossa  with multiple prominent/borderline enlarged left level IV/V and  supraclavicular lymph nodes. These findings are considered  indeterminate at this time. They may be reactive, but  malignancy/metastatic disease is not excluded. Consider short interval    Had CT chest , abdomen pelvis on 04/2022 showed:  1.  No lymphadenopathy in the chest, abdomen or pelvis. No  supraclavicular lymphadenopathy.   2.  Cholelithiasis.  3.  Mild colonic diverticulosis.  discussed with patient his concern   Will obtain neck CT for follow up     Plan: CT Soft Tissue Neck w Contrast    (Z12.83) Skin cancer screening    Plan: Adult Dermatology Referral    Patient has been advised of split billing requirements and indicates understanding: Yes      COUNSELING:   Reviewed preventive health counseling, as reflected in patient instructions       Regular exercise       Healthy diet/nutrition      BMI:   Estimated body mass index is 32.43 kg/m  as calculated from the following:    Height as of this encounter: 1.778 m (5' 10\").    Weight as of this encounter: 102.5 kg (226 lb).   Weight management plan: Discussed healthy diet and exercise guidelines      He reports that he has never smoked. He has never used smokeless tobacco.        Ousmane Wade MD  [facility]  "

## 2023-03-15 ENCOUNTER — TELEPHONE (OUTPATIENT)
Dept: FAMILY MEDICINE | Facility: CLINIC | Age: 63
End: 2023-03-15

## 2023-03-15 RX ORDER — ROSUVASTATIN CALCIUM 20 MG/1
20 TABLET, COATED ORAL DAILY
Qty: 90 TABLET | Refills: 1 | Status: SHIPPED | OUTPATIENT
Start: 2023-03-15 | End: 2023-12-15

## 2023-03-15 NOTE — TELEPHONE ENCOUNTER
Patient calling about lab results completed on 3/13/2023. Patient inquiring if PCP would like to start him on a medication for elevated cholesterol levels. If so, please send Rx to [facility] pharmacy in [place].     RN notes no interpretation of results available.    Routing to provider to review and advise.    Sarai Barron RN  [facility]

## 2023-07-26 ENCOUNTER — OFFICE VISIT (OUTPATIENT)
Dept: URGENT CARE | Facility: URGENT CARE | Age: 63
End: 2023-07-26
Payer: COMMERCIAL

## 2023-07-26 ENCOUNTER — NURSE TRIAGE (OUTPATIENT)
Dept: FAMILY MEDICINE | Facility: CLINIC | Age: 63
End: 2023-07-26
Payer: COMMERCIAL

## 2023-07-26 VITALS
TEMPERATURE: 97.9 F | OXYGEN SATURATION: 97 % | BODY MASS INDEX: 31.85 KG/M2 | SYSTOLIC BLOOD PRESSURE: 138 MMHG | RESPIRATION RATE: 18 BRPM | WEIGHT: 222 LBS | HEART RATE: 61 BPM | DIASTOLIC BLOOD PRESSURE: 75 MMHG

## 2023-07-26 DIAGNOSIS — J06.9 VIRAL URI: Primary | ICD-10-CM

## 2023-07-26 PROCEDURE — 99213 OFFICE O/P EST LOW 20 MIN: CPT | Performed by: FAMILY MEDICINE

## 2023-07-26 RX ORDER — BENZONATATE 200 MG/1
200 CAPSULE ORAL 3 TIMES DAILY PRN
Qty: 15 CAPSULE | Refills: 0 | Status: SHIPPED | OUTPATIENT
Start: 2023-07-26 | End: 2023-08-17

## 2023-07-26 NOTE — PROGRESS NOTES
Assessment & Plan     Viral URI  Viral uri, lungs clear  - benzonatate (TESSALON) 200 MG capsule  Dispense: 15 capsule; Refill: 0             No follow-ups on file.    Roderick Li MD  [facility] [place]    Subjective     Ney is a 63 year old male who presents to clinic today for the following health issues:  Chief Complaint   Patient presents with    Urgent Care    Cough     Per patient has been having a cough for about 1.54 weeks. It feels like a cold and he has a tickle. It comes and goes. Cough has been productive-had phlegm on and off and also wheezing a couple of days ago.     HPI    Cough  No fever  Tickle in throat  No meds  No ear pain  No CP/SOB        Review of Systems        Objective    /75   Pulse 61   Temp 97.9  F (36.6  C) (Tympanic)   Resp 18   Wt 100.7 kg (222 lb)   SpO2 97%   BMI 31.85 kg/m    Physical Exam  Vitals and nursing note reviewed.   Constitutional:       Appearance: Normal appearance.   HENT:      Right Ear: Tympanic membrane normal.      Left Ear: Tympanic membrane normal.      Mouth/Throat:      Mouth: Mucous membranes are moist.   Eyes:      Pupils: Pupils are equal, round, and reactive to light.   Cardiovascular:      Rate and Rhythm: Normal rate and regular rhythm.      Pulses: Normal pulses.      Heart sounds: Normal heart sounds.   Pulmonary:      Effort: Pulmonary effort is normal.      Breath sounds: Normal breath sounds.   Musculoskeletal:      Cervical back: Neck supple.   Neurological:      Mental Status: He is alert.                     Consent (Nose)/Introductory Paragraph: The rationale for Mohs was explained to the patient and consent was obtained. The risks, benefits and alternatives to therapy were discussed in detail. Specifically, the risks of nasal deformity, changes in the flow of air through the nose, infection, scarring, bleeding, prolonged wound healing, incomplete removal, allergy to anesthesia, nerve injury and recurrence were addressed. Prior to the procedure, the treatment site was clearly identified and confirmed by the patient. All components of Universal Protocol/PAUSE Rule completed.

## 2023-07-26 NOTE — TELEPHONE ENCOUNTER
Patient reports that he has a cough for about 1.54 weeks. It feels like a cold and he has a tickle. It comes and goes. No new medications, foods or chemical exposure. He has had phlegm on and off. He did have wheezing a couple of days ago.     Denies:  SOB, trouble breathing, chest pain    Nursing advice:  Due to his age and the wheezing he experienced a couple of days ago he is to be assessed in urgent care tonight. Patient verbalized good understanding, agrees with plan and needs no further support.  Thank you. Nenita Flood R.N.       Reason for Disposition   Wheezing is present     Wheezing a couple of days ago    Additional Information   Negative: Bluish (or gray) lips or face   Negative: SEVERE difficulty breathing (e.g., struggling for each breath, speaks in single words)   Negative: Rapid onset of cough and has hives   Negative: Coughing started suddenly after medicine, an allergic food or bee sting   Negative: Difficulty breathing after exposure to flames, smoke, or fumes   Negative: Sounds like a life-threatening emergency to the triager   Negative: MODERATE difficulty breathing (e.g., speaks in phrases, SOB even at rest, pulse 100-120) and still present when not coughing   Negative: Chest pain present when not coughing   Negative: Passed out (i.e., fainted, collapsed and was not responding)   Negative: Patient sounds very sick or weak to the triager   Negative: MILD difficulty breathing (e.g., minimal/no SOB at rest, SOB with walking, pulse <100) and still present when not coughing   Negative: Coughed up > 1 tablespoon (15 ml) blood (Exception: Blood-tinged sputum.)   Negative: Fever > 103 F (39.4 C)   Negative: Fever > 101 F (38.3 C) and over 60 years of age   Negative: Fever > 100.0 F (37.8 C) and has diabetes mellitus or a weak immune system (e.g., HIV positive, cancer chemotherapy, organ transplant, splenectomy, chronic steroids)   Negative: Fever > 100.0 F (37.8 C) and bedridden (e.g., nursing home  patient, stroke, chronic illness, recovering from surgery)   Negative: Increasing ankle swelling    Protocols used: Cough-A-OH

## 2023-08-17 ENCOUNTER — OFFICE VISIT (OUTPATIENT)
Dept: FAMILY MEDICINE | Facility: CLINIC | Age: 63
End: 2023-08-17
Payer: COMMERCIAL

## 2023-08-17 ENCOUNTER — ANCILLARY PROCEDURE (OUTPATIENT)
Dept: GENERAL RADIOLOGY | Facility: CLINIC | Age: 63
End: 2023-08-17
Attending: FAMILY MEDICINE
Payer: COMMERCIAL

## 2023-08-17 VITALS
DIASTOLIC BLOOD PRESSURE: 72 MMHG | HEART RATE: 73 BPM | OXYGEN SATURATION: 96 % | BODY MASS INDEX: 32.21 KG/M2 | TEMPERATURE: 98.1 F | RESPIRATION RATE: 16 BRPM | SYSTOLIC BLOOD PRESSURE: 120 MMHG | WEIGHT: 225 LBS | HEIGHT: 70 IN

## 2023-08-17 DIAGNOSIS — M25.551 BILATERAL HIP PAIN: Primary | ICD-10-CM

## 2023-08-17 DIAGNOSIS — M25.552 BILATERAL HIP PAIN: Primary | ICD-10-CM

## 2023-08-17 DIAGNOSIS — M25.551 BILATERAL HIP PAIN: ICD-10-CM

## 2023-08-17 DIAGNOSIS — M25.552 BILATERAL HIP PAIN: ICD-10-CM

## 2023-08-17 PROCEDURE — 73522 X-RAY EXAM HIPS BI 3-4 VIEWS: CPT | Mod: TC | Performed by: RADIOLOGY

## 2023-08-17 PROCEDURE — 99213 OFFICE O/P EST LOW 20 MIN: CPT | Performed by: FAMILY MEDICINE

## 2023-08-17 ASSESSMENT — PATIENT HEALTH QUESTIONNAIRE - PHQ9: SUM OF ALL RESPONSES TO PHQ QUESTIONS 1-9: 11

## 2023-08-17 ASSESSMENT — PAIN SCALES - GENERAL: PAINLEVEL: MODERATE PAIN (5)

## 2023-08-17 NOTE — PROGRESS NOTES
Assessment & Plan     Bilateral hip pain  Ney Lester is a 63 year old male who presents today for bilateral hip pain   Started 1 year ago and has been getting worse , pain is worse with activity and better with rest - takes tylenol which helps   Exam showed normal range of motion , no hip tenderness  I suspect osteoarthritis    I had a discussion with the patient about his condition , diagnosis treatment options. We discussed diffenetial diagnosis, and expected course.  We discussed   I recommend the following :    Patient education: In depth discussion and education was provided about the assessment and implications of each of the below recommendations for management. Patient indicated readiness to learn, all questions were answered and understanding of material presented was confirmed.  Work-up: X ray hip   Therapy/equipment/braces: referral to PT  Medications: uses OTC like tylenol    Interventions: none  Referral / follow up with other provider : PT   Follow up:  as needed      - XR Hip Bilateral 2 Views Each; Future  - Physical Therapy Referral; Future  Patient verbalized understanding and agreed on the plan of care. All questions answered.              Depression Screening Follow Up        8/17/2023    12:41 PM   PHQ   PHQ-9 Total Score 11   Q9: Thoughts of better off dead/self-harm past 2 weeks Not at all         8/17/2023    12:41 PM   Last PHQ-9   1.  Little interest or pleasure in doing things 1   2.  Feeling down, depressed, or hopeless 1   3.  Trouble falling or staying asleep, or sleeping too much 1   4.  Feeling tired or having little energy 2   5.  Poor appetite or overeating 2   6.  Feeling bad about yourself 2   7.  Trouble concentrating 1   8.  Moving slowly or restless 1   Q9: Thoughts of better off dead/self-harm past 2 weeks 0   PHQ-9 Total Score 11   Difficulty at work, home, or with people Not difficult at all       Follow Up Actions Taken       Based on all available evidence and  "evaluation, overall Risk for harm is low and is appropriate for outpatient level of care.   Recommended that patient call 911 or go to the local ED should there be a change in any of these risk factors.      MD CARMEN Higgins [facility]    Heber Brewster is a 63 year old, presenting for the following health issues:  Musculoskeletal Problem        8/17/2023    12:35 PM   Additional Questions   Roomed by Elizabeth   Accompanied by Self       History of Present Illness       Back Pain:  He presents for follow up of back pain. Patient's back pain is a recurring problem.  Location of back pain:  Right middle of back  Description of back pain: dull ache  Back pain spreads: right thigh, left thigh, right knee and left knee    Since patient first noticed back pain, pain is: unchanged  Does back pain interfere with his job:  Not applicable       He eats 2-3 servings of fruits and vegetables daily.He consumes 2 sweetened beverage(s) daily.He exercises with enough effort to increase his heart rate 10 to 19 minutes per day.  He exercises with enough effort to increase his heart rate 3 or less days per week. He is missing 1 dose(s) of medications per week.     Ney Lester is a 63 year old male who presents today for bilateral hip pain   Started 1 years   Pain is worse with activity , alleviated by setting   Take tlenl             Review of Systems   Constitutional, HEENT, cardiovascular, pulmonary, gi and gu systems are negative, except as otherwise noted.      Objective    /72   Pulse 73   Temp 98.1  F (36.7  C) (Tympanic)   Resp 16   Ht 1.778 m (5' 10\")   Wt 102.1 kg (225 lb)   SpO2 96%   BMI 32.28 kg/m    Body mass index is 32.28 kg/m .  Physical Exam  Vitals and nursing note reviewed.   Constitutional:       General: He is not in acute distress.     Appearance: Normal appearance. He is not ill-appearing, toxic-appearing or diaphoretic.   HENT:      Head: Normocephalic and atraumatic. "   Musculoskeletal:      Right hip: No deformity, lacerations, tenderness, bony tenderness or crepitus. Normal range of motion. Normal strength.      Left hip: No deformity, lacerations, tenderness, bony tenderness or crepitus. Normal range of motion. Normal strength.   Neurological:      Mental Status: He is alert.

## 2023-09-07 ENCOUNTER — THERAPY VISIT (OUTPATIENT)
Dept: PHYSICAL THERAPY | Facility: CLINIC | Age: 63
End: 2023-09-07
Attending: FAMILY MEDICINE
Payer: COMMERCIAL

## 2023-09-07 DIAGNOSIS — M25.552 BILATERAL HIP PAIN: ICD-10-CM

## 2023-09-07 DIAGNOSIS — M25.551 BILATERAL HIP PAIN: ICD-10-CM

## 2023-09-07 PROCEDURE — 97110 THERAPEUTIC EXERCISES: CPT | Mod: GP | Performed by: PHYSICAL THERAPIST

## 2023-09-07 PROCEDURE — 97161 PT EVAL LOW COMPLEX 20 MIN: CPT | Mod: GP | Performed by: PHYSICAL THERAPIST

## 2023-09-07 NOTE — PROGRESS NOTES
PHYSICAL THERAPY EVALUATION  Type of Visit: Evaluation    See electronic medical record for Abuse and Falls Screening details.    Subjective       Presenting condition or subjective complaint: bilat hip pain.  Sx x 1-2 years.  Possibly built up over time.  Date of onset: 08/17/23 (MD referral)    Relevant medical history:     Dates & types of surgery: R wrist    Prior diagnostic imaging/testing results: X-ray (There are mild  degenerative changes in both hips.)     Prior therapy history for the same diagnosis, illness or injury:        Prior Level of Function  Transfers: Independent  Ambulation: Independent  ADL: Independent  IADL:     Living Environment  Social support: Alone   Type of home: House   Stairs to enter the home: Yes       Ramp:     Stairs inside the home: Yes   Is there a railing: Yes   Help at home: None  Equipment owned:       Employment: Yes landscapping  Hobbies/Interests:      Patient goals for therapy:      Pain assessment:      Objective   HIP EVALUATION  PAIN: Pain Level at Rest: 0/10  Pain Level with Use: 6/10  Pain Location: hip  Pain Quality: Aching, Dull, Sharp, and Stabbing  Pain Frequency: intermittent  Pain is Worst: daytime  Pain is Exacerbated By: walking x 5 min, stairs  Pain is Relieved By: using treadmill  Pain Progression: Unchanged  INTEGUMENTARY (edema, incisions): WNL  POSTURE: WNL  GAIT:   Weightbearing Status:   Assistive Device(s):   Gait Deviations:   BALANCE/PROPRIOCEPTION:   WEIGHTBEARING ALIGNMENT:   NON-WEIGHTBEARING ALIGNMENT:    ROM:  full hip and lumbar ROM    PELVIC/SI SCREEN:   STRENGTH:   Pain: - none + mild ++ moderate +++ severe  Strength Scale: 0-5/5 Left Right   Hip Flexion 5- 5-   Hip Extension 4 4   Hip Abduction 4 4   Hip Adduction     Hip Internal Rotation     Hip External Rotation 4 4   Knee Flexion     Knee Extension       LE FLEXIBILITY:   SPECIAL TESTS:    Left Right   LINA Positive Positive   FADIR/Labrum/JORDIN Negative  Negative    Femoral Nerve      Xiao's Negative  Negative    Piriformis Positive Positive   Quadrant Testing     SLR Negative  Negative    Slump Negative  Negative    Stork with Extension     Chuy            FUNCTIONAL TESTS:   PALPATION:  tender bilat GT  JOINT MOBILITY:     Assessment & Plan   CLINICAL IMPRESSIONS  Medical Diagnosis: bilat hip pain    Treatment Diagnosis: bilat hip pain   Impression/Assessment: Patient is a 63 year old male with bilat hip complaints.  The following significant findings have been identified: Pain, Decreased ROM/flexibility, Decreased strength, Impaired muscle performance, and Decreased activity tolerance. These impairments interfere with their ability to perform self care tasks, work tasks, recreational activities, household chores, driving , household mobility, and community mobility as compared to previous level of function.     Clinical Decision Making (Complexity):  Clinical Presentation: Stable/Uncomplicated  Clinical Presentation Rationale: based on medical and personal factors listed in PT evaluation  Clinical Decision Making (Complexity): Low complexity    PLAN OF CARE  Treatment Interventions:  Interventions: Manual Therapy, Neuromuscular Re-education, Therapeutic Activity, Therapeutic Exercise, Self-Care/Home Management    Long Term Goals     PT Goal 1  Goal Identifier: hips  Goal Description: pt able to ambulate x 1 hour without pain  Rationale: to maximize safety and independence with performance of ADLs and functional tasks;to maximize safety and independence within the home;to maximize safety and independence within the community;to maximize safety and independence with transportation;to maximize safety and independence with self cares  Target Date: 11/30/23      Frequency of Treatment: 2x/ month  Duration of Treatment: 3 month    Recommended Referrals to Other Professionals:   Education Assessment:   Learner/Method: Patient;Demonstration;Pictures/Video    Risks and benefits of  evaluation/treatment have been explained.   Patient/Family/caregiver agrees with Plan of Care.     Evaluation Time:     PT Eval, Low Complexity Minutes (79087): 15       Signing Clinician: VIDHI Ibrahim [facility]                                                                                   OUTPATIENT PHYSICAL THERAPY      PLAN OF TREATMENT FOR OUTPATIENT REHABILITATION   Patient's Last Name, First Name, Ney Fitzgerald YOB: 1960   Provider's Name   [facility]   Medical Record No.  [id number]     Onset Date: 08/17/23 (MD referral)  Start of Care Date: 09/07/23     Medical Diagnosis:  bilat hip pain      PT Treatment Diagnosis:  bilat hip pain Plan of Treatment  Frequency/Duration: 2x/ month/ 3 month    Certification date from 09/07/23 to 12/05/23         See note for plan of treatment details and functional goals     Chuy Marcus, PT                         I CERTIFY THE NEED FOR THESE SERVICES FURNISHED UNDER        THIS PLAN OF TREATMENT AND WHILE UNDER MY CARE     (Physician attestation of this document indicates review and certification of the therapy plan).                Referring Provider:  Ousmane Wade      Initial Assessment  See Epic Evaluation- Start of Care Date: 09/07/23

## 2023-09-22 ENCOUNTER — THERAPY VISIT (OUTPATIENT)
Dept: PHYSICAL THERAPY | Facility: CLINIC | Age: 63
End: 2023-09-22
Payer: COMMERCIAL

## 2023-09-22 DIAGNOSIS — M25.552 BILATERAL HIP PAIN: Primary | ICD-10-CM

## 2023-09-22 DIAGNOSIS — M25.551 BILATERAL HIP PAIN: Primary | ICD-10-CM

## 2023-09-22 PROCEDURE — 97110 THERAPEUTIC EXERCISES: CPT | Mod: GP | Performed by: PHYSICAL THERAPIST

## 2023-10-20 ENCOUNTER — THERAPY VISIT (OUTPATIENT)
Dept: PHYSICAL THERAPY | Facility: CLINIC | Age: 63
End: 2023-10-20
Payer: COMMERCIAL

## 2023-10-20 DIAGNOSIS — M25.551 BILATERAL HIP PAIN: Primary | ICD-10-CM

## 2023-10-20 DIAGNOSIS — M25.552 BILATERAL HIP PAIN: Primary | ICD-10-CM

## 2023-10-20 PROCEDURE — 97110 THERAPEUTIC EXERCISES: CPT | Mod: GP | Performed by: PHYSICAL THERAPIST

## 2023-10-20 PROCEDURE — 97140 MANUAL THERAPY 1/> REGIONS: CPT | Mod: GP | Performed by: PHYSICAL THERAPIST

## 2023-11-24 DIAGNOSIS — R35.1 BENIGN PROSTATIC HYPERPLASIA WITH NOCTURIA: ICD-10-CM

## 2023-11-24 DIAGNOSIS — N40.1 BENIGN PROSTATIC HYPERPLASIA WITH NOCTURIA: ICD-10-CM

## 2023-11-24 DIAGNOSIS — N52.9 ERECTILE DYSFUNCTION, UNSPECIFIED ERECTILE DYSFUNCTION TYPE: ICD-10-CM

## 2023-11-24 RX ORDER — SILDENAFIL CITRATE 20 MG/1
TABLET ORAL
Qty: 30 TABLET | Refills: 1 | Status: SHIPPED | OUTPATIENT
Start: 2023-11-24

## 2023-11-24 RX ORDER — TAMSULOSIN HYDROCHLORIDE 0.4 MG/1
0.4 CAPSULE ORAL DAILY
Qty: 90 CAPSULE | Refills: 1 | Status: SHIPPED | OUTPATIENT
Start: 2023-11-24 | End: 2024-08-21

## 2023-11-28 DIAGNOSIS — F41.9 ANXIETY AND DEPRESSION: ICD-10-CM

## 2023-11-28 DIAGNOSIS — F32.A ANXIETY AND DEPRESSION: ICD-10-CM

## 2023-11-29 RX ORDER — CITALOPRAM HYDROBROMIDE 20 MG/1
20 TABLET ORAL DAILY
Qty: 90 TABLET | Refills: 1 | Status: SHIPPED | OUTPATIENT
Start: 2023-11-29 | End: 2024-09-04

## 2023-12-13 PROBLEM — M25.552 BILATERAL HIP PAIN: Status: RESOLVED | Noted: 2023-09-07 | Resolved: 2023-12-13

## 2023-12-13 PROBLEM — M25.551 BILATERAL HIP PAIN: Status: RESOLVED | Noted: 2023-09-07 | Resolved: 2023-12-13

## 2023-12-13 NOTE — PROGRESS NOTES
10/20/23 0500   Appointment Info   Signing clinician's name / credentials Chuy Marcus PT   Total/Authorized Visits 4   Visits Used 3   Medical Diagnosis bilat hip pain   PT Tx Diagnosis bilat hip pain   Quick Adds Certification   Progress Note/Certification   Start of Care Date 09/07/23   Onset of illness/injury or Date of Surgery 08/17/23  (MD referral)   Therapy Frequency 2x/ month   Predicted Duration 3 month   Certification date from 09/07/23   Certification date to 12/05/23   PT Goal 1   Goal Identifier hips   Goal Description pt able to ambulate x 1 hour without pain   Rationale to maximize safety and independence with performance of ADLs and functional tasks;to maximize safety and independence within the home;to maximize safety and independence within the community;to maximize safety and independence with transportation;to maximize safety and independence with self cares   Goal Progress currently 30 - 45 min   Target Date 11/30/23   Subjective Report   Subjective Report A little better.  SLow progress.   Objective Measure 1   Objective Measure gait   Details normal gait pattern without pain   Objective Measure 2   Objective Measure strength   Details slow improvement   Treatment Interventions (PT)   Interventions Therapeutic Procedure/Exercise;Manual Therapy   Therapeutic Procedure/Exercise   Therapeutic Procedures: strength, endurance, ROM, flexibillity minutes (48369) 30   Ther Proc 1 bike   Ther Proc 1 - Details 5 min for warm up   PTRx Ther Proc 1 Supine Lumbar Hip Roll   PTRx Ther Proc 1 - Details 3 x 10 sec in clinic bilat   PTRx Ther Proc 2 Piriformis Stretch Below 90 Degrees Supine   PTRx Ther Proc 2 - Details bilat x 10 sec    PTRx Ther Proc 3 Clamshell Feet together   PTRx Ther Proc 3 - Details x 25 bilat   PTRx Ther Proc 4 Hip Abduction Straight Leg Raise   PTRx Ther Proc 4 - Details x 25 bilat - reminder to keep toes FW   PTRx Ther Proc 5 Bridging #1   PTRx Ther Proc 5 - Details x 25 in  clinic   PTRx Ther Proc 6 Hip AROM Standing Extension   PTRx Ther Proc 6 - Details x 20 - reminder to keep knees straight   PTRx Ther Proc 7 Squat   PTRx Ther Proc 7 - Details x 25 - hand assist for balance   Skilled Intervention slight correction required only today - progressed HEP   PTRx Ther Proc 8 Sidestep   PTRx Ther Proc 8 - Details 4 x 20 feet <>   PTRx Ther Proc 9 Standing Hip Abduction   PTRx Ther Proc 9 - Details x 15 bilat due to fatigue   Manual Therapy   Manual Therapy: Mobilization, MFR, MLD, friction massage minutes (54005) 10   Manual Therapy 1 bilat LE distraction supine   Manual Therapy 1 - Details for pain relief   Skilled Intervention intial MT   Patient Response/Progress tolerated well.  reported decreased sx   Education   Learner/Method Patient;Demonstration;Pictures/Video   Plan   Home program PTRX HO   Plan for next session continue x 1   Total Session Time   Timed Code Treatment Minutes 40   Total Treatment Time (sum of timed and untimed services) 40         DISCHARGE  Reason for Discharge: Patient has failed to schedule further appointments.    Equipment Issued:     Discharge Plan: cancelled last visit and did not reschedule.  Discharge pt from PT.      Referring Provider:  Ousmane Wade

## 2023-12-15 ENCOUNTER — HOSPITAL ENCOUNTER (EMERGENCY)
Facility: CLINIC | Age: 63
Discharge: HOME | End: 2023-12-15
Attending: FAMILY MEDICINE | Admitting: FAMILY MEDICINE
Payer: COMMERCIAL

## 2023-12-15 ENCOUNTER — NURSE TRIAGE (OUTPATIENT)
Dept: FAMILY MEDICINE | Facility: CLINIC | Age: 63
End: 2023-12-15
Payer: COMMERCIAL

## 2023-12-15 VITALS
WEIGHT: 220 LBS | HEIGHT: 70 IN | DIASTOLIC BLOOD PRESSURE: 85 MMHG | HEART RATE: 71 BPM | OXYGEN SATURATION: 95 % | BODY MASS INDEX: 31.5 KG/M2 | SYSTOLIC BLOOD PRESSURE: 158 MMHG | TEMPERATURE: 98.3 F | RESPIRATION RATE: 16 BRPM

## 2023-12-15 DIAGNOSIS — F10.930 ALCOHOL WITHDRAWAL SYNDROME WITHOUT COMPLICATION (H): ICD-10-CM

## 2023-12-15 DIAGNOSIS — R25.1 TREMOR: ICD-10-CM

## 2023-12-15 DIAGNOSIS — E78.5 HYPERLIPIDEMIA LDL GOAL <100: ICD-10-CM

## 2023-12-15 DIAGNOSIS — E87.1 HYPONATREMIA: ICD-10-CM

## 2023-12-15 LAB
ALBUMIN SERPL BCG-MCNC: 4.2 G/DL (ref 3.5–5.2)
ALP SERPL-CCNC: 74 U/L (ref 40–150)
ALT SERPL W P-5'-P-CCNC: 26 U/L (ref 0–70)
ANION GAP SERPL CALCULATED.3IONS-SCNC: 12 MMOL/L (ref 7–15)
AST SERPL W P-5'-P-CCNC: 11 U/L (ref 0–45)
BASOPHILS # BLD AUTO: 0.1 10E3/UL (ref 0–0.2)
BASOPHILS NFR BLD AUTO: 1 %
BILIRUB SERPL-MCNC: 0.8 MG/DL
BUN SERPL-MCNC: 12.5 MG/DL (ref 8–23)
CALCIUM SERPL-MCNC: 9.3 MG/DL (ref 8.8–10.2)
CHLORIDE SERPL-SCNC: 93 MMOL/L (ref 98–107)
CREAT SERPL-MCNC: 1.01 MG/DL (ref 0.67–1.17)
DEPRECATED HCO3 PLAS-SCNC: 25 MMOL/L (ref 22–29)
EGFRCR SERPLBLD CKD-EPI 2021: 84 ML/MIN/1.73M2
EOSINOPHIL # BLD AUTO: 0 10E3/UL (ref 0–0.7)
EOSINOPHIL NFR BLD AUTO: 1 %
ERYTHROCYTE [DISTWIDTH] IN BLOOD BY AUTOMATED COUNT: 13.5 % (ref 10–15)
ETHANOL SERPL-MCNC: <0.01 G/DL
GLUCOSE SERPL-MCNC: 102 MG/DL (ref 70–99)
HCT VFR BLD AUTO: 42.8 % (ref 40–53)
HGB BLD-MCNC: 14.3 G/DL (ref 13.3–17.7)
IMM GRANULOCYTES # BLD: 0 10E3/UL
IMM GRANULOCYTES NFR BLD: 0 %
LYMPHOCYTES # BLD AUTO: 1.4 10E3/UL (ref 0.8–5.3)
LYMPHOCYTES NFR BLD AUTO: 18 %
MCH RBC QN AUTO: 29.4 PG (ref 26.5–33)
MCHC RBC AUTO-ENTMCNC: 33.4 G/DL (ref 31.5–36.5)
MCV RBC AUTO: 88 FL (ref 78–100)
MONOCYTES # BLD AUTO: 0.9 10E3/UL (ref 0–1.3)
MONOCYTES NFR BLD AUTO: 11 %
NEUTROPHILS # BLD AUTO: 5.5 10E3/UL (ref 1.6–8.3)
NEUTROPHILS NFR BLD AUTO: 69 %
NRBC # BLD AUTO: 0 10E3/UL
NRBC BLD AUTO-RTO: 0 /100
PLATELET # BLD AUTO: 227 10E3/UL (ref 150–450)
POTASSIUM SERPL-SCNC: 3.8 MMOL/L (ref 3.4–5.3)
PROT SERPL-MCNC: 7.2 G/DL (ref 6.4–8.3)
RBC # BLD AUTO: 4.87 10E6/UL (ref 4.4–5.9)
SODIUM SERPL-SCNC: 130 MMOL/L (ref 135–145)
WBC # BLD AUTO: 7.9 10E3/UL (ref 4–11)

## 2023-12-15 PROCEDURE — 36415 COLL VENOUS BLD VENIPUNCTURE: CPT | Performed by: FAMILY MEDICINE

## 2023-12-15 PROCEDURE — 80053 COMPREHEN METABOLIC PANEL: CPT | Performed by: FAMILY MEDICINE

## 2023-12-15 PROCEDURE — 99284 EMERGENCY DEPT VISIT MOD MDM: CPT | Performed by: FAMILY MEDICINE

## 2023-12-15 PROCEDURE — 99283 EMERGENCY DEPT VISIT LOW MDM: CPT | Performed by: FAMILY MEDICINE

## 2023-12-15 PROCEDURE — 82077 ASSAY SPEC XCP UR&BREATH IA: CPT | Performed by: FAMILY MEDICINE

## 2023-12-15 PROCEDURE — 85025 COMPLETE CBC W/AUTO DIFF WBC: CPT | Performed by: FAMILY MEDICINE

## 2023-12-15 RX ORDER — LORAZEPAM 0.5 MG/1
TABLET ORAL
Qty: 20 TABLET | Refills: 0 | Status: SHIPPED | OUTPATIENT
Start: 2023-12-15

## 2023-12-15 ASSESSMENT — ENCOUNTER SYMPTOMS
NAUSEA: 0
PALPITATIONS: 0
SHORTNESS OF BREATH: 0
ABDOMINAL PAIN: 0
AGITATION: 0
HEMATOLOGIC/LYMPHATIC NEGATIVE: 1
TREMORS: 1
MUSCULOSKELETAL NEGATIVE: 1
DIFFICULTY URINATING: 0
FEVER: 0
CONFUSION: 0

## 2023-12-15 ASSESSMENT — ACTIVITIES OF DAILY LIVING (ADL): ADLS_ACUITY_SCORE: 33

## 2023-12-15 NOTE — DISCHARGE INSTRUCTIONS
Use an electrolyte drink for fluid replacement.    Liberalize salt intake.    Take medication as needed for tremors.    Follow-up with AA or your recovery group as you have suggested.    Return to ER if you are having worsening symptoms.

## 2023-12-15 NOTE — TELEPHONE ENCOUNTER
Patient calling in and reports on day 4 currently, of being sober. Patient reports drinks approximately a 12 pack of beer for the last 7 weeks. Patient reports had previously been sober for the last few years.     Patient reports whole body tremors. Denies hallucinations, auditory/visual, or tactile.      Disposition: GO TO ED NOW:  Patient agrees to plan of care and will go to [facility] due to tremors.    Reason for Disposition   Questions or concerns about substance use (drug use), unhealthy drug use, intoxication, or withdrawal   Feeling very shaky (i.e., visible tremors of hands)    Additional Information   Negative: Coma (e.g., not moving, not talking, not responding to stimuli)   Negative: Difficult to awaken or acting confused (e.g., disoriented, slurred speech)   Negative: Seeing, hearing, or feeling things that are not there (i.e., visual, auditory, or tactile hallucinations)   Negative: Slow, shallow and weak breathing   Negative: Seizure   Negative: Violent behavior, or threatening to physically hurt or kill someone   Negative: Patient attempted suicide   Negative: Threatening suicide   Negative: Sounds like a life-threatening emergency to the triager    Protocols used: Alcohol Use and Bsrgiolx-N-[id number], Substance Use and Hgdmypmw-E-SY    Keisha Castellanos RN

## 2023-12-15 NOTE — ED PROVIDER NOTES
"BP (!) 143/81   Pulse 86   Temp 98.3  F (36.8  C) (Tympanic)   Resp 16   Ht 1.778 m (5' 10\")   Wt 99.8 kg (220 lb)   SpO2 98%   BMI 31.57 kg/m      Ney Lester is a 63-year-old male presenting with complaints of alcohol withdrawal.  Patient states he started drinking again 7 weeks ago after being sober for 2 and a half years.  He has not had a drink in 4 days and has developed associated shaking and dizziness.  Given patient's history, I recommended he be evaluated in the emergency department.  We discussed that he will likely require further testing and/or treatment beyond the capabilities of the current urgent care setting.  Patient expresses understanding of this and agreement with the plan.  Patient was transferred to triage in stable condition.     Nu Reyna PA-C  12/15/23 1529    "

## 2023-12-15 NOTE — ED PROVIDER NOTES
History     Chief Complaint   Patient presents with    Alcohol Problem     HPI  Ney Lester is a 63 year old male who presents with tremulousness.    This patient has stopped alcohol intake in the last 4 days and has noticed some mild tremors today.  He was told to present to the ER.    History indicates that he was sober for 2-1/2 years.  He then had a bad break-up with his long-term girlfriend which was unexpected.  He started drinking again.  Drank for about 7 weeks and stopped 4 days ago.  His drink of choice was beer, about a 12 pack/day perhaps slightly more.    Currently he is not having abdominal pain or vomiting.  No weakness.  No confusion.  He has been drinking extra water.    He went to  previously.  He also has a recovery group which he potentially could attend in the future and would like to do this.  He would like to reobtain sobriety.    He takes Celexa, Flomax, Crestor.  Has a history of anxiety.      Allergies:  No Known Allergies    Problem List:    Patient Active Problem List    Diagnosis Date Noted    Alcoholism (H) 02/01/2022     Priority: Medium    CARDIOVASCULAR SCREENING; LDL GOAL LESS THAN 160 10/31/2010     Priority: Medium        Past Medical History:    Past Medical History:   Diagnosis Date    Anxiety     Hyperlipidemia        Past Surgical History:    Past Surgical History:   Procedure Laterality Date    COLONOSCOPY N/A 5/9/2022    Procedure: COLONOSCOPY, FLEXIBLE, WITH LESION REMOVAL USING SNARE;  Surgeon: Grant Land DO;  Location: [facility] OR    COLONOSCOPY WITH CO2 INSUFFLATION N/A 5/9/2022    Procedure: COLONOSCOPY, WITH CO2 INSUFFLATION;  Surgeon: Grant Land DO;  Location: [facility] OR    WRIST SURGERY Left        Family History:    Family History   Problem Relation Age of Onset    Chronic Obstructive Pulmonary Disease Mother        Social History:  Marital Status:  Single [1]  Social History     Tobacco Use    Smoking status: Never    Smokeless tobacco: Never   Vaping Use  "   Vaping Use: Never used   Substance Use Topics    Alcohol use: Not Currently    Drug use: Never        Medications:    LORazepam (ATIVAN) 0.5 MG tablet  citalopram (CELEXA) 20 MG tablet  cyclobenzaprine (FLEXERIL) 10 MG tablet  meloxicam (MOBIC) 15 MG tablet  rosuvastatin (CRESTOR) 20 MG tablet  sildenafil (REVATIO) 20 MG tablet  tamsulosin (FLOMAX) 0.4 MG capsule          Review of Systems   Constitutional:  Negative for fever.   HENT: Negative.     Eyes:  Negative for visual disturbance.   Respiratory:  Negative for shortness of breath.    Cardiovascular:  Negative for chest pain and palpitations.   Gastrointestinal:  Negative for abdominal pain and nausea.   Genitourinary:  Negative for difficulty urinating.   Musculoskeletal: Negative.    Skin:  Negative for rash.   Neurological:  Positive for tremors.   Hematological: Negative.    Psychiatric/Behavioral:  Negative for agitation and confusion.        Physical Exam   BP: (!) 143/81  Pulse: 86  Temp: 98.3  F (36.8  C)  Resp: 16  Height: 177.8 cm (5' 10\")  Weight: 99.8 kg (220 lb) (stated)  SpO2: 98 %      Physical Exam  Constitutional:       General: He is not in acute distress.  HENT:      Head: Normocephalic.      Mouth/Throat:      Mouth: Mucous membranes are moist.      Pharynx: Oropharynx is clear.   Eyes:      General: No scleral icterus.  Cardiovascular:      Rate and Rhythm: Normal rate and regular rhythm.      Heart sounds: No murmur heard.  Pulmonary:      Breath sounds: Normal breath sounds.   Abdominal:      General: There is no distension.      Tenderness: There is no abdominal tenderness.   Musculoskeletal:         General: No swelling or tenderness.      Cervical back: No tenderness.   Lymphadenopathy:      Cervical: No cervical adenopathy.   Skin:     General: Skin is warm and dry.      Coloration: Skin is not jaundiced.   Neurological:      General: No focal deficit present.      Mental Status: He is alert.      Comments: Very mild, fine tongue " and hand tremors are noted.   Psychiatric:         Mood and Affect: Mood normal.         ED Course                 Procedures             Critical Care time:               Results for orders placed or performed during the hospital encounter of 12/15/23 (from the past 24 hour(s))   Comprehensive metabolic panel   Result Value Ref Range    Sodium 130 (L) 135 - 145 mmol/L    Potassium 3.8 3.4 - 5.3 mmol/L    Carbon Dioxide (CO2) 25 22 - 29 mmol/L    Anion Gap 12 7 - 15 mmol/L    Urea Nitrogen 12.5 8.0 - 23.0 mg/dL    Creatinine 1.01 0.67 - 1.17 mg/dL    GFR Estimate 84 >60 mL/min/1.73m2    Calcium 9.3 8.8 - 10.2 mg/dL    Chloride 93 (L) 98 - 107 mmol/L    Glucose 102 (H) 70 - 99 mg/dL    Alkaline Phosphatase 74 40 - 150 U/L    AST 11 0 - 45 U/L    ALT 26 0 - 70 U/L    Protein Total 7.2 6.4 - 8.3 g/dL    Albumin 4.2 3.5 - 5.2 g/dL    Bilirubin Total 0.8 <=1.2 mg/dL   CBC with platelets, differential    Narrative    The following orders were created for panel order CBC with platelets, differential.  Procedure                               Abnormality         Status                     ---------                               -----------         ------                     CBC with platelets and d...[[id number]]                      Final result                 Please view results for these tests on the individual orders.   Alcohol level blood   Result Value Ref Range    Alcohol ethyl <0.01 <=0.01 g/dL   CBC with platelets and differential   Result Value Ref Range    WBC Count 7.9 4.0 - 11.0 10e3/uL    RBC Count 4.87 4.40 - 5.90 10e6/uL    Hemoglobin 14.3 13.3 - 17.7 g/dL    Hematocrit 42.8 40.0 - 53.0 %    MCV 88 78 - 100 fL    MCH 29.4 26.5 - 33.0 pg    MCHC 33.4 31.5 - 36.5 g/dL    RDW 13.5 10.0 - 15.0 %    Platelet Count 227 150 - 450 10e3/uL    % Neutrophils 69 %    % Lymphocytes 18 %    % Monocytes 11 %    % Eosinophils 1 %    % Basophils 1 %    % Immature Granulocytes 0 %    NRBCs per 100 WBC 0 <1 /100    Absolute  Neutrophils 5.5 1.6 - 8.3 10e3/uL    Absolute Lymphocytes 1.4 0.8 - 5.3 10e3/uL    Absolute Monocytes 0.9 0.0 - 1.3 10e3/uL    Absolute Eosinophils 0.0 0.0 - 0.7 10e3/uL    Absolute Basophils 0.1 0.0 - 0.2 10e3/uL    Absolute Immature Granulocytes 0.0 <=0.4 10e3/uL    Absolute NRBCs 0.0 10e3/uL       Medications - No data to display    Assessments & Plan (with Medical Decision Making)     History as noted above.  On exam he has mild tremulousness but no tachycardia or agitation.  Alcohol level is 0.  He does show some hyponatremia, likely from his water intake.  I discussed managing with some modest doses of lorazepam and also to alter his intake to more of an electrolyte drink and liberalize salt intake.  I believe this is all that is indicated at this point.  Patient is agreeable with this plan.        I have reviewed the nursing notes.    I have reviewed the findings, diagnosis, plan and need for follow up with the patient.          Medical Decision Making  The patient's presentation was of .  Moderate complexity.    The patient's evaluation involved:  History, physical exam, review of labs.      The patient's management necessitated medication management and instruction on intake.        New Prescriptions    LORAZEPAM (ATIVAN) 0.5 MG TABLET    1-2 tabs every 6-8 hours if needed for tremors       Final diagnoses:   Tremor   Alcohol withdrawal syndrome without complication (H)   Hyponatremia       12/15/2023   [facility] EMERGENCY DEPT       Obinna Moran MD  12/15/23 4532

## 2023-12-15 NOTE — ED TRIAGE NOTES
Pt had been sober for 2.5 years, about 7 weeks ago started drinking again. Hasn't had a drink 4 days, having some shaking and dizziness     Triage Assessment (Adult)       Row Name 12/15/23 3800          Triage Assessment    Airway WDL WDL        Respiratory WDL    Respiratory WDL WDL        Skin Circulation/Temperature WDL    Skin Circulation/Temperature WDL WDL        Cardiac WDL    Cardiac WDL WDL        Peripheral/Neurovascular WDL    Peripheral Neurovascular WDL WDL        Cognitive/Neuro/Behavioral WDL    Cognitive/Neuro/Behavioral WDL WDL

## 2023-12-18 RX ORDER — ROSUVASTATIN CALCIUM 20 MG/1
20 TABLET, COATED ORAL DAILY
Qty: 90 TABLET | Refills: 0 | Status: SHIPPED | OUTPATIENT
Start: 2023-12-18 | End: 2024-05-26

## 2024-02-12 ENCOUNTER — PATIENT OUTREACH (OUTPATIENT)
Dept: CARE COORDINATION | Facility: CLINIC | Age: 64
End: 2024-02-12

## 2024-04-14 ENCOUNTER — HEALTH MAINTENANCE LETTER (OUTPATIENT)
Age: 64
End: 2024-04-14

## 2024-05-03 NOTE — NURSING NOTE
Ney Lester's chief complaint for this visit includes:  Chief Complaint   Patient presents with     Consult     supraclavicular Lymphadenopathy     PCP: No Ref-Primary, Physician    Referring Provider:  Ousmane Wade MD  [id number]   [place] [id number]    /79   Pulse 89   Data Unavailable      No Known Allergies      Do you need any medication refills at today's visit?     n/a

## 2024-05-26 DIAGNOSIS — E78.5 HYPERLIPIDEMIA LDL GOAL <100: ICD-10-CM

## 2024-05-27 RX ORDER — ROSUVASTATIN CALCIUM 20 MG/1
20 TABLET, COATED ORAL DAILY
Qty: 90 TABLET | Refills: 0 | Status: SHIPPED | OUTPATIENT
Start: 2024-05-27

## 2024-08-20 DIAGNOSIS — N40.1 BENIGN PROSTATIC HYPERPLASIA WITH NOCTURIA: ICD-10-CM

## 2024-08-20 DIAGNOSIS — R35.1 BENIGN PROSTATIC HYPERPLASIA WITH NOCTURIA: ICD-10-CM

## 2024-08-21 RX ORDER — TAMSULOSIN HYDROCHLORIDE 0.4 MG/1
0.4 CAPSULE ORAL DAILY
Qty: 90 CAPSULE | Refills: 0 | Status: SHIPPED | OUTPATIENT
Start: 2024-08-21

## 2024-08-21 NOTE — TELEPHONE ENCOUNTER
Spoke with pt, he has been taking medication as prescribed. He just had an excess at home.     Routing to provider - Please see above regarding refill request.     Thank you - Gail Morrissey, TAMIN, RN

## 2024-08-21 NOTE — TELEPHONE ENCOUNTER
Clinic RN: Please contact patient because patient should have run out of this medication on 5/24/24. Confirm patient is taking this medication as prescribed. Document findings and route refill encounter to provider for approval or denial.

## 2024-09-04 DIAGNOSIS — F41.9 ANXIETY AND DEPRESSION: ICD-10-CM

## 2024-09-04 DIAGNOSIS — F32.A ANXIETY AND DEPRESSION: ICD-10-CM

## 2024-09-04 RX ORDER — CITALOPRAM HYDROBROMIDE 20 MG/1
20 TABLET ORAL DAILY
Qty: 90 TABLET | Refills: 0 | Status: SHIPPED | OUTPATIENT
Start: 2024-09-04

## 2024-09-09 ENCOUNTER — PATIENT OUTREACH (OUTPATIENT)
Dept: CARE COORDINATION | Facility: CLINIC | Age: 64
End: 2024-09-09

## 2024-10-07 DIAGNOSIS — E78.5 HYPERLIPIDEMIA LDL GOAL <100: ICD-10-CM

## 2024-10-09 RX ORDER — ROSUVASTATIN CALCIUM 20 MG/1
20 TABLET, COATED ORAL DAILY
Qty: 90 TABLET | Refills: 0 | Status: SHIPPED | OUTPATIENT
Start: 2024-10-09 | End: 2024-10-17

## 2024-10-17 ENCOUNTER — OFFICE VISIT (OUTPATIENT)
Dept: FAMILY MEDICINE | Facility: CLINIC | Age: 64
End: 2024-10-17

## 2024-10-17 VITALS
BODY MASS INDEX: 32.64 KG/M2 | SYSTOLIC BLOOD PRESSURE: 132 MMHG | WEIGHT: 228 LBS | TEMPERATURE: 98.6 F | OXYGEN SATURATION: 99 % | DIASTOLIC BLOOD PRESSURE: 70 MMHG | HEIGHT: 70 IN | HEART RATE: 94 BPM

## 2024-10-17 DIAGNOSIS — F10.20 ALCOHOLISM (H): ICD-10-CM

## 2024-10-17 DIAGNOSIS — Z12.5 SCREENING FOR PROSTATE CANCER: ICD-10-CM

## 2024-10-17 DIAGNOSIS — E78.5 HYPERLIPIDEMIA LDL GOAL <100: ICD-10-CM

## 2024-10-17 DIAGNOSIS — Z00.00 ROUTINE GENERAL MEDICAL EXAMINATION AT A HEALTH CARE FACILITY: Primary | ICD-10-CM

## 2024-10-17 DIAGNOSIS — R06.83 SNORING: ICD-10-CM

## 2024-10-17 DIAGNOSIS — B35.3 TINEA PEDIS OF BOTH FEET: ICD-10-CM

## 2024-10-17 LAB
ALBUMIN SERPL BCG-MCNC: 4.4 G/DL (ref 3.5–5.2)
ALP SERPL-CCNC: 67 U/L (ref 40–150)
ALT SERPL W P-5'-P-CCNC: 13 U/L (ref 0–70)
ANION GAP SERPL CALCULATED.3IONS-SCNC: 10 MMOL/L (ref 7–15)
AST SERPL W P-5'-P-CCNC: 8 U/L (ref 0–45)
BASOPHILS # BLD AUTO: 0.1 10E3/UL (ref 0–0.2)
BASOPHILS NFR BLD AUTO: 1 %
BILIRUB SERPL-MCNC: 0.4 MG/DL
BUN SERPL-MCNC: 13.5 MG/DL (ref 8–23)
CALCIUM SERPL-MCNC: 10 MG/DL (ref 8.8–10.4)
CHLORIDE SERPL-SCNC: 100 MMOL/L (ref 98–107)
CHOLEST SERPL-MCNC: 274 MG/DL
CREAT SERPL-MCNC: 0.97 MG/DL (ref 0.67–1.17)
EGFRCR SERPLBLD CKD-EPI 2021: 87 ML/MIN/1.73M2
EOSINOPHIL # BLD AUTO: 0.1 10E3/UL (ref 0–0.7)
EOSINOPHIL NFR BLD AUTO: 1 %
ERYTHROCYTE [DISTWIDTH] IN BLOOD BY AUTOMATED COUNT: 13.2 % (ref 10–15)
EST. AVERAGE GLUCOSE BLD GHB EST-MCNC: 100 MG/DL
FASTING STATUS PATIENT QL REPORTED: ABNORMAL
FASTING STATUS PATIENT QL REPORTED: NORMAL
GLUCOSE SERPL-MCNC: 82 MG/DL (ref 70–99)
HBA1C MFR BLD: 5.1 % (ref 0–5.6)
HCO3 SERPL-SCNC: 28 MMOL/L (ref 22–29)
HCT VFR BLD AUTO: 43.5 % (ref 40–53)
HDLC SERPL-MCNC: 60 MG/DL
HGB BLD-MCNC: 14.5 G/DL (ref 13.3–17.7)
IMM GRANULOCYTES # BLD: 0 10E3/UL
IMM GRANULOCYTES NFR BLD: 0 %
LDLC SERPL CALC-MCNC: ABNORMAL MG/DL
LDLC SERPL DIRECT ASSAY-MCNC: 157 MG/DL
LYMPHOCYTES # BLD AUTO: 1.6 10E3/UL (ref 0.8–5.3)
LYMPHOCYTES NFR BLD AUTO: 20 %
MCH RBC QN AUTO: 29.4 PG (ref 26.5–33)
MCHC RBC AUTO-ENTMCNC: 33.3 G/DL (ref 31.5–36.5)
MCV RBC AUTO: 88 FL (ref 78–100)
MONOCYTES # BLD AUTO: 0.7 10E3/UL (ref 0–1.3)
MONOCYTES NFR BLD AUTO: 8 %
NEUTROPHILS # BLD AUTO: 5.5 10E3/UL (ref 1.6–8.3)
NEUTROPHILS NFR BLD AUTO: 70 %
NONHDLC SERPL-MCNC: 214 MG/DL
PLATELET # BLD AUTO: 248 10E3/UL (ref 150–450)
POTASSIUM SERPL-SCNC: 4.1 MMOL/L (ref 3.4–5.3)
PROT SERPL-MCNC: 7.6 G/DL (ref 6.4–8.3)
PSA SERPL DL<=0.01 NG/ML-MCNC: 5.1 NG/ML (ref 0–4.5)
RBC # BLD AUTO: 4.93 10E6/UL (ref 4.4–5.9)
SODIUM SERPL-SCNC: 138 MMOL/L (ref 135–145)
TRIGL SERPL-MCNC: 504 MG/DL
WBC # BLD AUTO: 7.9 10E3/UL (ref 4–11)

## 2024-10-17 PROCEDURE — 90471 IMMUNIZATION ADMIN: CPT | Performed by: FAMILY MEDICINE

## 2024-10-17 PROCEDURE — 90677 PCV20 VACCINE IM: CPT | Performed by: FAMILY MEDICINE

## 2024-10-17 PROCEDURE — 83036 HEMOGLOBIN GLYCOSYLATED A1C: CPT | Performed by: FAMILY MEDICINE

## 2024-10-17 PROCEDURE — 99214 OFFICE O/P EST MOD 30 MIN: CPT | Mod: 25 | Performed by: FAMILY MEDICINE

## 2024-10-17 PROCEDURE — 90673 RIV3 VACCINE NO PRESERV IM: CPT | Performed by: FAMILY MEDICINE

## 2024-10-17 PROCEDURE — 80053 COMPREHEN METABOLIC PANEL: CPT | Performed by: FAMILY MEDICINE

## 2024-10-17 PROCEDURE — 80061 LIPID PANEL: CPT | Performed by: FAMILY MEDICINE

## 2024-10-17 PROCEDURE — 85025 COMPLETE CBC W/AUTO DIFF WBC: CPT | Performed by: FAMILY MEDICINE

## 2024-10-17 PROCEDURE — 83721 ASSAY OF BLOOD LIPOPROTEIN: CPT | Mod: 59 | Performed by: FAMILY MEDICINE

## 2024-10-17 PROCEDURE — 90472 IMMUNIZATION ADMIN EACH ADD: CPT | Performed by: FAMILY MEDICINE

## 2024-10-17 PROCEDURE — 36415 COLL VENOUS BLD VENIPUNCTURE: CPT | Performed by: FAMILY MEDICINE

## 2024-10-17 PROCEDURE — G0103 PSA SCREENING: HCPCS | Performed by: FAMILY MEDICINE

## 2024-10-17 PROCEDURE — 99396 PREV VISIT EST AGE 40-64: CPT | Performed by: FAMILY MEDICINE

## 2024-10-17 RX ORDER — ROSUVASTATIN CALCIUM 20 MG/1
20 TABLET, COATED ORAL DAILY
Qty: 90 TABLET | Refills: 3 | Status: SHIPPED | OUTPATIENT
Start: 2024-10-17

## 2024-10-17 RX ORDER — CLOTRIMAZOLE 1 %
CREAM (GRAM) TOPICAL 2 TIMES DAILY
Qty: 113 G | Refills: 0 | Status: SHIPPED | OUTPATIENT
Start: 2024-10-17 | End: 2024-11-16

## 2024-10-17 RX ORDER — NALTREXONE HYDROCHLORIDE 50 MG/1
50 TABLET, FILM COATED ORAL DAILY
Qty: 90 TABLET | Refills: 1 | Status: SHIPPED | OUTPATIENT
Start: 2024-10-17

## 2024-10-17 SDOH — HEALTH STABILITY: PHYSICAL HEALTH: ON AVERAGE, HOW MANY DAYS PER WEEK DO YOU ENGAGE IN MODERATE TO STRENUOUS EXERCISE (LIKE A BRISK WALK)?: 2 DAYS

## 2024-10-17 ASSESSMENT — SOCIAL DETERMINANTS OF HEALTH (SDOH): HOW OFTEN DO YOU GET TOGETHER WITH FRIENDS OR RELATIVES?: TWICE A WEEK

## 2024-10-17 ASSESSMENT — PAIN SCALES - GENERAL: PAINLEVEL: NO PAIN (0)

## 2024-10-17 NOTE — PROGRESS NOTES
"Preventive Care Visit  [facility]  Ousmane Wade MD, Family Medicine  Oct 17, 2024      Assessment & Plan     Routine general medical examination at a health care facility    Health maintenance screening and immunizations reviewed with the patient.    We discussed healthy lifestyle, nutrition, cardiovascular risk reduction.  - Screen Labs ordered   - Continue great active lifestyle  - Follow up yearly for the annual physical and preventive care.    - CBC with Platelets & Differential; Future  - Comprehensive metabolic panel; Future  - Hemoglobin A1c; Future  - Lipid panel reflex to direct LDL Fasting; Future  - CBC with Platelets & Differential  - Comprehensive metabolic panel  - Hemoglobin A1c    Alcoholism (H)  History and to cut down, needs help.  Will start naltrexone, discussed referral to therapy but he declined at this point.  Continue to monitor.  - naltrexone (DEPADE/REVIA) 50 MG tablet; Take 1 tablet (50 mg) by mouth daily.    Hyperlipidemia LDL goal <100  Chronic stable problem.  Continue Crestor 20 mg, refill provided.  - Lipid panel reflex to direct LDL Non-fasting; Future  - rosuvastatin (CRESTOR) 20 MG tablet; Take 1 tablet (20 mg) by mouth daily.  - Lipid panel reflex to direct LDL Non-fasting    Tinea pedis of both feet  Bilateral tinea pedis.  - clotrimazole (LOTRIMIN) 1 % external cream; Apply topically 2 times daily.    Snoring  Loud snoring noticed by partner.  Concerning for DEBBIE.  - Adult Sleep Eval & Management  Referral; Future    Screening for prostate cancer    - Prostate Specific Antigen Screen; Future  - Prostate Specific Antigen Screen            BMI  Estimated body mass index is 32.71 kg/m  as calculated from the following:    Height as of this encounter: 1.778 m (5' 10\").    Weight as of this encounter: 103.4 kg (228 lb).   Weight management plan: Discussed healthy diet and exercise guidelines    Counseling  Appropriate preventive services were " addressed with this patient via screening, questionnaire, or discussion as appropriate for fall prevention, nutrition, physical activity, Tobacco-use cessation, social engagement, weight loss and cognition.  Checklist reviewing preventive services available has been given to the patient.  Reviewed patient's diet, addressing concerns and/or questions.   He is at risk for lack of exercise and has been provided with information to increase physical activity for the benefit of his well-being.   The patient was instructed to see the dentist every 6 months.   He is at risk for psychosocial distress and has been provided with information to reduce risk.   The patient reports drinking more than 3 alcoholic drinks per day and/or more than 7 drhnks per week. The patient was counseled and given information about possible harmful effects of excessive alcohol intake.    Work on weight loss  Regular exercise    Heber Brewster is a 64 year old, presenting for the following:  Physical        10/17/2024     2:26 PM   Additional Questions   Roomed by axel        Health Care Directive  Patient does not have a Health Care Directive or Living Will: Discussed advance care planning with patient; however, patient declined at this time.    HPI      Preventive -    Immunization History   Administered Date(s) Administered    COVID-19 MONOVALENT 12+ (Pfizer) 01/26/2022, 02/16/2022    Influenza (IIV3) PF 11/19/2007, 02/12/2009    Influenza Vaccine 18-64 (Flublok) 02/01/2022    Influenza Vaccine Trivalent (FluBlok) 10/17/2024    Influenza, seasonal, injectable, PF 10/01/2009    Pneumococcal 20 valent Conjugate (Prevnar 20) 10/17/2024    TDAP (Adacel,Boostrix) 02/12/2009, 02/01/2022    Td (Adult), Adsorbed 07/26/1999       - Colon CA screen: Colonoscopy, age 45-75 every 10 years or FIT every year or Cologuard every 3 years   Had colonoscopy in 2022                          removed with a cold snare. Resected and retrieved.   Recommendation:            - Patient has a contact number available for                             emergencies. The signs and symptoms of potential                             delayed complications were discussed with the                             patient. Return to normal activities tomorrow.                             Written discharge instructions were provided to the                             patient.                             - Resume previous diet.                             - Continue present medications.                             - Await pathology results.                             - Repeat colonoscopy in 7-10 years for surveillance                             based on pathology results.         - Prostate CA screen: Discussed controversy about screening.   PSA   Date Value Ref Range Status   07/02/2021 4.05 (H) 0 - 4 ug/L Final     Comment:     Assay Method:  Chemiluminescence using Siemens Vista analyzer     Prostate Specific Antigen Screen   Date Value Ref Range Status   03/13/2023 4.76 (H) 0.00 - 4.00 ug/L Final         -lipids screen: ordered     Diabetes screen: ordered                   10/17/2024   General Health   How would you rate your overall physical health? Good   Feel stress (tense, anxious, or unable to sleep) Only a little      (!) STRESS CONCERN      10/17/2024   Nutrition   Three or more servings of calcium each day? (!) I DON'T KNOW   Diet: Low salt    I don't know   How many servings of fruit and vegetables per day? (!) 0-1   How many sweetened beverages each day? 0-1       Multiple values from one day are sorted in reverse-chronological order         10/17/2024   Exercise   Days per week of moderate/strenous exercise 2 days      (!) EXERCISE CONCERN      10/17/2024   Social Factors   Frequency of gathering with friends or relatives Twice a week   Worry food won't last until get money to buy more No   Food not last or not have enough money for food? No   Do you have housing? (Housing is defined  as stable permanent housing and does not include staying ouside in a car, in a tent, in an abandoned building, in an overnight shelter, or couch-surfing.) Yes   Are you worried about losing your housing? No   Lack of transportation? No   Unable to get utilities (heat,electricity)? No            10/17/2024   Fall Risk   Fallen 2 or more times in the past year? No   Trouble with walking or balance? No             10/17/2024   Dental   Dentist two times every year? (!) NO            10/17/2024   TB Screening   Were you born outside of the [place]? No            Today's PHQ-2 Score:       10/17/2024     2:07 PM   PHQ-2 ( 1999 Pfizer)   Q1: Little interest or pleasure in doing things 1   Q2: Feeling down, depressed or hopeless 1   PHQ-2 Score 2   Q1: Little interest or pleasure in doing things Several days   Q2: Feeling down, depressed or hopeless Several days   PHQ-2 Score 2           10/17/2024   Substance Use   Alcohol more than 3/day or more than 7/wk Yes   How often do you have a drink containing alcohol 2 to 3 times a week   How many alcohol drinks on typical day 3 or 4   How often do you have 5+ drinks at one occasion Weekly   Audit 2/3 Score 4   How often not able to stop drinking once started Less than monthly   How often failed to do what normally expected Never   How often needed first drink in am after a heavy drinking session Never   How often feeling of guilt or remorse after drinking Never   How often unable to remember what happened the night before Never   Have you or someone else been injured because of your drinking No   Has anyone been concerned or suggested you cut down on drinking Yes, but not in the last year   TOTAL SCORE - AUDIT 10   Do you use any other substances recreationally? No        Social History     Tobacco Use    Smoking status: Never    Smokeless tobacco: Never   Vaping Use    Vaping status: Never Used   Substance Use Topics    Alcohol use: Not Currently    Drug use: Never            "10/17/2024   STI Screening   New sexual partner(s) since last STI/HIV test? No      Last PSA:   PSA   Date Value Ref Range Status   07/02/2021 4.05 (H) 0 - 4 ug/L Final     Comment:     Assay Method:  Chemiluminescence using Siemens Vista analyzer     Prostate Specific Antigen Screen   Date Value Ref Range Status   03/13/2023 4.76 (H) 0.00 - 4.00 ug/L Final     ASCVD Risk   The 10-year ASCVD risk score (Kenyon DIMAS, et al., 2019) is: 15.3%    Values used to calculate the score:      Age: 64 years      Sex: Male      Is Non- : No      Diabetic: No      Tobacco smoker: No      Systolic Blood Pressure: 132 mmHg      Is BP treated: No      HDL Cholesterol: 49 mg/dL      Total Cholesterol: 262 mg/dL           Reviewed and updated as needed this visit by Provider     Meds                         Objective    Exam  /70   Pulse 94   Temp 98.6  F (37  C)   Ht 1.778 m (5' 10\")   Wt 103.4 kg (228 lb)   SpO2 99%   BMI 32.71 kg/m     Estimated body mass index is 32.71 kg/m  as calculated from the following:    Height as of this encounter: 1.778 m (5' 10\").    Weight as of this encounter: 103.4 kg (228 lb).    Physical Exam  GENERAL: alert and no distress  NECK: no adenopathy, no asymmetry, masses, or scars  RESP: lungs clear to auscultation - no rales, rhonchi or wheezes  CV: regular rate and rhythm, normal S1 S2, no S3 or S4, no murmur, click or rub, no peripheral edema  ABDOMEN: soft, nontender, no hepatosplenomegaly, no masses and bowel sounds normal  MS: no gross musculoskeletal defects noted, no edema        Signed Electronically by: Ousmane Wade MD    "

## 2024-10-29 DIAGNOSIS — N52.9 ERECTILE DYSFUNCTION, UNSPECIFIED ERECTILE DYSFUNCTION TYPE: ICD-10-CM

## 2024-10-30 RX ORDER — SILDENAFIL CITRATE 20 MG/1
TABLET ORAL
Qty: 30 TABLET | Refills: 0 | Status: SHIPPED | OUTPATIENT
Start: 2024-10-30

## 2025-01-14 ENCOUNTER — OFFICE VISIT (OUTPATIENT)
Dept: UROLOGY | Facility: CLINIC | Age: 65
End: 2025-01-14
Attending: FAMILY MEDICINE
Payer: COMMERCIAL

## 2025-01-14 VITALS
DIASTOLIC BLOOD PRESSURE: 79 MMHG | WEIGHT: 230 LBS | HEART RATE: 71 BPM | TEMPERATURE: 98.2 F | OXYGEN SATURATION: 97 % | SYSTOLIC BLOOD PRESSURE: 143 MMHG | BODY MASS INDEX: 33 KG/M2

## 2025-01-14 DIAGNOSIS — R97.20 ELEVATED PROSTATE SPECIFIC ANTIGEN (PSA): Primary | ICD-10-CM

## 2025-01-14 DIAGNOSIS — R97.20 ELEVATED PROSTATE SPECIFIC ANTIGEN (PSA): ICD-10-CM

## 2025-01-14 LAB — PSA SERPL DL<=0.01 NG/ML-MCNC: 4.55 NG/ML (ref 0–4.5)

## 2025-01-14 PROCEDURE — 36415 COLL VENOUS BLD VENIPUNCTURE: CPT | Performed by: STUDENT IN AN ORGANIZED HEALTH CARE EDUCATION/TRAINING PROGRAM

## 2025-01-14 PROCEDURE — 84153 ASSAY OF PSA TOTAL: CPT | Performed by: STUDENT IN AN ORGANIZED HEALTH CARE EDUCATION/TRAINING PROGRAM

## 2025-01-14 NOTE — PROGRESS NOTES
Chief Complaint:   Elevated PSA         History of Present Illness:   Ney Lester is a 65 year old male with a history of alcohol use disorder who presents for evaluation of elevated PSA.    PSA history:  10/17/2024: 5.10  3/13/2023: 4.76  2022: 4.60  2021: 4.05    He had a prostate MRI on 2022 which showed a 48 g prostate and PI-RADS 2 lesions. His father was diagnosed with low grade prostate cancer and  from other causes.          Past Medical History:     Past Medical History:   Diagnosis Date    Anxiety     Hyperlipidemia             Past Surgical History:     Past Surgical History:   Procedure Laterality Date    COLONOSCOPY N/A 2022    Procedure: COLONOSCOPY, FLEXIBLE, WITH LESION REMOVAL USING SNARE;  Surgeon: Grant Land DO;  Location: [facility] OR    COLONOSCOPY WITH CO2 INSUFFLATION N/A 2022    Procedure: COLONOSCOPY, WITH CO2 INSUFFLATION;  Surgeon: Grant Land DO;  Location: [facility] OR    WRIST SURGERY Left             Medications     Current Outpatient Medications   Medication Sig Dispense Refill    citalopram (CELEXA) 20 MG tablet Take 1 tablet (20 mg) by mouth daily 90 tablet 0    naltrexone (DEPADE/REVIA) 50 MG tablet Take 1 tablet (50 mg) by mouth daily. 90 tablet 1    rosuvastatin (CRESTOR) 20 MG tablet Take 1 tablet (20 mg) by mouth daily. 90 tablet 3    sildenafil (REVATIO) 20 MG tablet Take 1-5 tabs prior to intercourse on empty stomach as needed 30 tablet 0    tamsulosin (FLOMAX) 0.4 MG capsule Take 1 capsule (0.4 mg) by mouth daily 90 capsule 0     No current facility-administered medications for this visit.            Allergies:   Patient has no known allergies.         Review of Systems:  From intake questionnaire   Negative 14 system review except as noted on HPI, nurse's note.         Physical Exam:   Patient is a 65 year old  male   Vitals: Blood pressure (!) 143/79, pulse 71, temperature 98.2  F (36.8  C), temperature source Tympanic, weight 104.3 kg  (230 lb), SpO2 97%.  General Appearance Adult: Alert, no acute distress, oriented.  Lungs: Non-labored breathing.  Heart: No obvious jugular venous distension present.  Neuro: Alert, oriented, speech and mentation normal      Labs and Pathology:    I personally reviewed all applicable laboratory data and went over findings with patient  Significant for:    CBC RESULTS:  Recent Labs   Lab Test 10/17/24  1502 12/15/23  1709 06/18/22  1546 02/01/22  1110   WBC 7.9 7.9 7.1 5.6   HGB 14.5 14.3 14.1 14.3    227 240 212        BMP RESULTS:  Recent Labs   Lab Test 10/17/24  1502 12/15/23  1709 03/13/23  1007 06/18/22  1546    130* 140 138   POTASSIUM 4.1 3.8 4.5 4.0   CHLORIDE 100 93* 108 106   CO2 28 25 31 28   ANIONGAP 10 12 1* 4   GLC 82 102* 101* 89   BUN 13.5 12.5 16 18   CR 0.97 1.01 0.95 1.03   GFRESTIMATED 87 84 90 82   PETE 10.0 9.3 9.3 8.8       PSA RESULTS  PSA   Date Value Ref Range Status   07/02/2021 4.05 (H) 0 - 4 ug/L Final     Comment:     Assay Method:  Chemiluminescence using Siemens Vista analyzer     Prostate Specific Antigen Screen   Date Value Ref Range Status   10/17/2024 5.10 (H) 0.00 - 4.50 ng/mL Final   03/13/2023 4.76 (H) 0.00 - 4.00 ug/L Final   02/01/2022 4.60 (H) 0.00 - 4.00 ug/L Final            Assessment and Plan:     Assessment: 65 year old male who presents for evaluation of elevated PSA. He had a prostate MRI on 5/08/2022 which showed a 48 g prostate and PI-RADS 2 lesions. His PSA was 4.60 ng/mL at the time and is now 5.10 ng/mL. We will recheck his PSA since it has been three months since his last draw. If his PSA has increased, we will update prostate imaging with another prostate MRI with consideration for a subsequent biopsy.     Plan:  Repeat PSA today.     SHAE RATLIFF PA-C  Department of Urology

## 2025-01-14 NOTE — NURSING NOTE
"Initial BP (!) 143/79 (BP Location: Left arm, Patient Position: Sitting, Cuff Size: Adult Large)   Pulse 71   Temp 98.2  F (36.8  C) (Tympanic)   Wt 104.3 kg (230 lb)   SpO2 97%   BMI 33.00 kg/m   Estimated body mass index is 33 kg/m  as calculated from the following:    Height as of 10/17/24: 1.778 m (5' 10\").    Weight as of this encounter: 104.3 kg (230 lb). .  Mabel Frank MA on 1/14/2025 at 11:56 AM    "

## 2025-02-19 DIAGNOSIS — F32.A ANXIETY AND DEPRESSION: ICD-10-CM

## 2025-02-19 DIAGNOSIS — F41.9 ANXIETY AND DEPRESSION: ICD-10-CM

## 2025-02-20 RX ORDER — CITALOPRAM HYDROBROMIDE 20 MG/1
20 TABLET ORAL DAILY
Qty: 90 TABLET | Refills: 0 | Status: SHIPPED | OUTPATIENT
Start: 2025-02-20

## 2025-04-16 DIAGNOSIS — R35.1 BENIGN PROSTATIC HYPERPLASIA WITH NOCTURIA: ICD-10-CM

## 2025-04-16 DIAGNOSIS — N40.1 BENIGN PROSTATIC HYPERPLASIA WITH NOCTURIA: ICD-10-CM

## 2025-04-17 RX ORDER — TAMSULOSIN HYDROCHLORIDE 0.4 MG/1
0.4 CAPSULE ORAL DAILY
Qty: 90 CAPSULE | Refills: 0 | Status: SHIPPED | OUTPATIENT
Start: 2025-04-17

## 2025-07-06 DIAGNOSIS — F41.9 ANXIETY AND DEPRESSION: ICD-10-CM

## 2025-07-06 DIAGNOSIS — N52.9 ERECTILE DYSFUNCTION, UNSPECIFIED ERECTILE DYSFUNCTION TYPE: ICD-10-CM

## 2025-07-06 DIAGNOSIS — F32.A ANXIETY AND DEPRESSION: ICD-10-CM

## 2025-07-07 RX ORDER — CITALOPRAM HYDROBROMIDE 20 MG/1
20 TABLET ORAL DAILY
Qty: 90 TABLET | Refills: 0 | Status: SHIPPED | OUTPATIENT
Start: 2025-07-07

## 2025-07-07 RX ORDER — SILDENAFIL CITRATE 20 MG/1
TABLET ORAL
Qty: 30 TABLET | Refills: 0 | Status: SHIPPED | OUTPATIENT
Start: 2025-07-07

## 2025-08-15 DIAGNOSIS — R35.1 BENIGN PROSTATIC HYPERPLASIA WITH NOCTURIA: ICD-10-CM

## 2025-08-15 DIAGNOSIS — N40.1 BENIGN PROSTATIC HYPERPLASIA WITH NOCTURIA: ICD-10-CM

## 2025-08-16 RX ORDER — TAMSULOSIN HYDROCHLORIDE 0.4 MG/1
0.4 CAPSULE ORAL DAILY
Qty: 90 CAPSULE | Refills: 0 | Status: SHIPPED | OUTPATIENT
Start: 2025-08-16

## (undated) DEVICE — SOL WATER IRRIG 1000ML BOTTLE 07139-09

## (undated) RX ORDER — FENTANYL CITRATE 50 UG/ML
INJECTION, SOLUTION INTRAMUSCULAR; INTRAVENOUS
Status: DISPENSED
Start: 2022-05-09